# Patient Record
Sex: FEMALE | Race: WHITE | NOT HISPANIC OR LATINO | Employment: FULL TIME | URBAN - METROPOLITAN AREA
[De-identification: names, ages, dates, MRNs, and addresses within clinical notes are randomized per-mention and may not be internally consistent; named-entity substitution may affect disease eponyms.]

---

## 2017-02-16 ENCOUNTER — MYC MEDICAL ADVICE (OUTPATIENT)
Dept: FAMILY MEDICINE | Facility: CLINIC | Age: 22
End: 2017-02-16

## 2017-02-16 DIAGNOSIS — J01.90 ACUTE SINUSITIS WITH SYMPTOMS > 10 DAYS: Primary | ICD-10-CM

## 2017-02-16 NOTE — TELEPHONE ENCOUNTER
Please use the nurse protocol.  If does not meet criteria then she should be seen.    LUISA Enamorado CNP

## 2017-02-17 NOTE — TELEPHONE ENCOUNTER
RN Sinusitis Treatment Protocol: ages 16 and up  Leah Marie Schoenecker, a 21 year old female, is having symptoms reviewed for possible sinus infection.    ASSESSMENT/PLAN:  1.  Antibiotic treatment is indicated as onset of symptoms have been more than 10 days.  Amoxicillin-clavulanate 875mg/125mg orally twice daily or 500mg/125mg three times daily for 5 to 7 days.  2.  Education: ibuprofen or acetaminophen as directed on the bottle, over the counter decongestant, Nasal saline rinse, (Neti-pot or a nasal saline spray is preferred to Afrin nasal spray), Afrin nasal spray no longer than 5 days.  3.  Follow-up: Schedule an appointment with a provider if symptoms do not improve after 3 days of antibiotics or if symptoms return after antibiotic therapy is complete.   4.  Patient verbalized understanding of this plan and is agreeable.    SUBJECTIVE:  Symptoms: Facial pain or pressure over the sinus areas, especially if worse with position change or cough, Nasal discharge/purulent, Nasal congestion and Post nasal drip.     In addition notes: None   Shortness of breath: NO  Onset of symptoms was 18 day(s) ago.    Treatment measures tried include Fluids, OTC meds and Rest.   Course of illness is worsening.  Predisposing conditions include: None    Complicating Factors and Serious Symptoms:   Patient reports: NONE   Patient denies: Fever > 102.5  Orbital pain  Periorbital swelling or erythema  Severe facial swelling or erythema  Severe neck pain  This being the worse headache the patient has ever experienced  Vision changes  COPD (chronic obstructive pulmonary disease)    ALLERGIES:  No Known Allergies  OBJECTIVE:  Weight: 0 lbs 0 oz    Encounter handled by: Nurse Triage.     Anjali Bowles RN

## 2017-03-17 ENCOUNTER — TELEPHONE (OUTPATIENT)
Dept: FAMILY MEDICINE | Facility: CLINIC | Age: 22
End: 2017-03-17

## 2017-09-27 DIAGNOSIS — Z11.3 SCREEN FOR STD (SEXUALLY TRANSMITTED DISEASE): Primary | ICD-10-CM

## 2017-11-02 ENCOUNTER — OFFICE VISIT (OUTPATIENT)
Dept: FAMILY MEDICINE | Facility: CLINIC | Age: 22
End: 2017-11-02
Payer: COMMERCIAL

## 2017-11-02 VITALS
SYSTOLIC BLOOD PRESSURE: 110 MMHG | DIASTOLIC BLOOD PRESSURE: 70 MMHG | BODY MASS INDEX: 32.96 KG/M2 | HEIGHT: 67 IN | TEMPERATURE: 97.6 F | WEIGHT: 210 LBS | HEART RATE: 68 BPM

## 2017-11-02 DIAGNOSIS — F32.0 MILD MAJOR DEPRESSION (H): ICD-10-CM

## 2017-11-02 DIAGNOSIS — R30.0 DYSURIA: ICD-10-CM

## 2017-11-02 DIAGNOSIS — Z00.00 ENCOUNTER FOR ROUTINE ADULT HEALTH EXAMINATION WITHOUT ABNORMAL FINDINGS: Primary | ICD-10-CM

## 2017-11-02 DIAGNOSIS — Z30.41 ENCOUNTER FOR SURVEILLANCE OF CONTRACEPTIVE PILLS: ICD-10-CM

## 2017-11-02 DIAGNOSIS — Z11.3 ROUTINE SCREENING FOR STI (SEXUALLY TRANSMITTED INFECTION): ICD-10-CM

## 2017-11-02 LAB

## 2017-11-02 PROCEDURE — 87088 URINE BACTERIA CULTURE: CPT | Performed by: NURSE PRACTITIONER

## 2017-11-02 PROCEDURE — 81001 URINALYSIS AUTO W/SCOPE: CPT | Performed by: NURSE PRACTITIONER

## 2017-11-02 PROCEDURE — 87086 URINE CULTURE/COLONY COUNT: CPT | Performed by: NURSE PRACTITIONER

## 2017-11-02 PROCEDURE — 87186 SC STD MICRODIL/AGAR DIL: CPT | Performed by: NURSE PRACTITIONER

## 2017-11-02 PROCEDURE — 87491 CHLMYD TRACH DNA AMP PROBE: CPT | Performed by: NURSE PRACTITIONER

## 2017-11-02 PROCEDURE — 99395 PREV VISIT EST AGE 18-39: CPT | Performed by: NURSE PRACTITIONER

## 2017-11-02 RX ORDER — NORGESTIMATE AND ETHINYL ESTRADIOL 0.25-0.035
1 KIT ORAL DAILY
Qty: 84 TABLET | Refills: 3 | Status: SHIPPED | OUTPATIENT
Start: 2017-11-02 | End: 2018-03-01

## 2017-11-02 ASSESSMENT — ANXIETY QUESTIONNAIRES
7. FEELING AFRAID AS IF SOMETHING AWFUL MIGHT HAPPEN: MORE THAN HALF THE DAYS
3. WORRYING TOO MUCH ABOUT DIFFERENT THINGS: MORE THAN HALF THE DAYS
1. FEELING NERVOUS, ANXIOUS, OR ON EDGE: MORE THAN HALF THE DAYS
2. NOT BEING ABLE TO STOP OR CONTROL WORRYING: NEARLY EVERY DAY
6. BECOMING EASILY ANNOYED OR IRRITABLE: SEVERAL DAYS
GAD7 TOTAL SCORE: 13
5. BEING SO RESTLESS THAT IT IS HARD TO SIT STILL: MORE THAN HALF THE DAYS

## 2017-11-02 ASSESSMENT — PATIENT HEALTH QUESTIONNAIRE - PHQ9
5. POOR APPETITE OR OVEREATING: SEVERAL DAYS
SUM OF ALL RESPONSES TO PHQ QUESTIONS 1-9: 12

## 2017-11-02 NOTE — PROGRESS NOTES
SUBJECTIVE:   CC: Leah Marie Schoenecker is an 22 year old woman who presents for preventive health visit.     Healthy Habits:    Do you get at least three servings of calcium containing foods daily (dairy, green leafy vegetables, etc.)? yes    Amount of exercise or daily activities, outside of work: none - golf during summer     Problems taking medications regularly No    Medication side effects: Yes - see below     Have you had an eye exam in the past two years? yes    Do you see a dentist twice per year? yes    Do you have sleep apnea, excessive snoring or daytime drowsiness?no      Medication Followup of [facility]     Taking Medication as prescribed: yes    Side Effects:  Bloating, sore breast, cramps     Medication Helping Symptoms:  yes     No personal or family history of blood clots, stroke, migraine with aura or breast cancer.  Does not use tobacco.    Genitourinary symptoms      Duration: 1.5 months      Description:  Frequency, cloudy, foul smell, painful     Intensity:  moderate    Accompanying signs and symptoms (fever/discharge/nausea/vomiting/back or abdominal pain):  Nausea     History (frequent UTI's/kidney stones/prostate problems): None  Sexually active: YES    Precipitating or alleviating factors: None    Therapies tried and outcome: none   Outcome: na        Today's PHQ-2 Score:   PHQ-2 ( 1999 Pfizer) 5/27/2016 7/26/2013   Q1: Little interest or pleasure in doing things - 3   Q2: Feeling down, depressed or hopeless - 2   PHQ-2 Score - 5   Q1: Little interest or pleasure in doing things Several days -   Q2: Feeling down, depressed or hopeless Several days -   PHQ-2 Score 2 -       Abuse: Current or Past(Physical, Sexual or Emotional)- No  Do you feel safe in your environment - Yes  Social History   Substance Use Topics     Smoking status: Never Smoker     Smokeless tobacco: Never Used     Alcohol use Yes      Comment: monthly at most     The patient does not drink >3 drinks per day nor >7  "drinks per week.    Reviewed orders with patient.  Reviewed health maintenance and updated orders accordingly - Yes  Labs reviewed in EPIC    Mammogram not appropriate for this patient based on age.    Pertinent mammograms are reviewed under the imaging tab.  History of abnormal Pap smear: NO - age 30- 65 PAP every 3 years recommended    Reviewed and updated as needed this visit by clinical staff  Tobacco  Allergies  Meds  Med Hx  Surg Hx  Fam Hx  Soc Hx        Reviewed and updated as needed this visit by Provider              ROS:  C: NEGATIVE for fever, chills, change in weight  I: NEGATIVE for worrisome rashes, moles or lesions  E: NEGATIVE for vision changes or irritation  ENT: NEGATIVE for ear, mouth and throat problems  R: NEGATIVE for significant cough or SOB  B: NEGATIVE for masses, tenderness or discharge  CV: NEGATIVE for chest pain, palpitations or peripheral edema  GI: NEGATIVE for nausea, abdominal pain, heartburn, or change in bowel habits   female: POSITIVE for dysuria and urgency  M: NEGATIVE for significant arthralgias or myalgia  N: NEGATIVE for weakness, dizziness or paresthesias  P: NEGATIVE for changes in mood or affect    OBJECTIVE:   /70 (Cuff Size: Adult Large)  Pulse 68  Temp 97.6  F (36.4  C) (Tympanic)  Ht 5' 7\" (1.702 m)  Wt 210 lb (95.3 kg)  BMI 32.89 kg/m2  EXAM:  GENERAL: healthy, alert and no distress  EYES: Eyes grossly normal to inspection, PERRL and conjunctivae and sclerae normal  HENT: ear canals and TM's normal, nose and mouth without ulcers or lesions  NECK: no adenopathy, no asymmetry, masses, or scars and thyroid normal to palpation  RESP: lungs clear to auscultation - no rales, rhonchi or wheezes  BREAST: normal without masses, tenderness or nipple discharge and no palpable axillary masses or adenopathy  CV: regular rate and rhythm, normal S1 S2, no S3 or S4, no murmur, click or rub, no peripheral edema and peripheral pulses strong  ABDOMEN: soft, " "nontender, no hepatosplenomegaly, no masses and bowel sounds normal  MS: no gross musculoskeletal defects noted, no edema  SKIN: no suspicious lesions or rashes  NEURO: Normal strength and tone, mentation intact and speech normal  PSYCH: mentation appears normal, affect normal/bright    ASSESSMENT/PLAN:   1. Encounter for routine adult health examination without abnormal findings    - Urine Microscopic    2. Encounter for surveillance of contraceptive pills  Risks and benefits discussed.  Angie verbalized understanding of risks.  - norgestimate-ethinyl estradiol (ORTHO-CYCLEN, SPRINTEC) 0.25-35 MG-MCG per tablet; Take 1 tablet by mouth daily  Dispense: 84 tablet; Refill: 3    3. Routine screening for STI (sexually transmitted infection)    - Chlamydia trachomatis PCR    4. Dysuria  Will culture with symptoms and abnormal findings.  - *UA reflex to Microscopic and Culture ([place] [facility] (except [place] and [place])  - Urine Culture Aerobic Bacterial    5. Mild major depression (H)  PHQ-9 elevated-Angie feels that this is in good control.    Home care instructions were reviewed with the patient. The risks, benefits and treatment options of prescribed medications or other treatments have been discussed with the patient. The patient verbalized their understanding and should call or follow up if no improvement or if they develop further problems.      COUNSELING:   Reviewed preventive health counseling, as reflected in patient instructions         reports that she has never smoked. She has never used smokeless tobacco.    Estimated body mass index is 32.89 kg/(m^2) as calculated from the following:    Height as of this encounter: 5' 7\" (1.702 m).    Weight as of this encounter: 210 lb (95.3 kg).         Counseling Resources:  ATP IV Guidelines  Pooled Cohorts Equation Calculator  Breast Cancer Risk Calculator  FRAX Risk Assessment  ICSI Preventive Guidelines  Dietary Guidelines for Americans, 2010  USDA's " MyPlate  ASA Prophylaxis  Lung CA Screening    LUISA Enamorado CNP  [facility]

## 2017-11-02 NOTE — MR AVS SNAPSHOT
After Visit Summary   11/2/2017    Leah Marie Schoenecker    MRN: [id number]           Patient Information     Date Of Birth          1995        Visit Information        Provider Department      11/2/2017 9:20 AM Jaimee Kaplan APRN [facility]        Today's Diagnoses     Encounter for routine adult health examination without abnormal findings    -  1    Encounter for surveillance of contraceptive pills        Routine screening for STI (sexually transmitted infection)        Dysuria        Mild major depression (H)          Care Instructions      Preventive Health Recommendations  Female Ages 18 to 25     Yearly exam:     See your health care provider every year in order to  o Review health changes.   o Discuss preventive care.    o Review your medicines if your doctor has prescribed any.      You should be tested each year for STDs (sexually transmitted diseases).       After age 20, talk to your provider about how often you should have cholesterol testing.      Starting at age 21, get a Pap test every three years. If you have an abnormal result, your doctor may have you test more often.      If you are at risk for diabetes, you should have a diabetes test (fasting glucose).     Shots:     Get a flu shot each year.     Get a tetanus shot every 10 years.     Consider getting the shot (vaccine) that prevents cervical cancer (Gardasil).    Nutrition:     Eat at least 5 servings of fruits and vegetables each day.    Eat whole-grain bread, whole-wheat pasta and brown rice instead of white grains and rice.    Talk to your provider about Calcium and Vitamin D.     Lifestyle    Exercise at least 150 minutes a week each week (30 minutes a day, 5 days a week). This will help you control your weight and prevent disease.    Limit alcohol to one drink per day.    No smoking.     Wear sunscreen to prevent skin cancer.    See your dentist every six months for an exam and  "cleaning.          Follow-ups after your visit        Who to contact     If you have questions or need follow up information about today's clinic visit or your schedule please contact [facility] directly at [phone].  Normal or non-critical lab and imaging results will be communicated to you by MyChart, letter or phone within 4 business days after the clinic has received the results. If you do not hear from us within 7 days, please contact the clinic through MyChart or phone. If you have a critical or abnormal lab result, we will notify you by phone as soon as possible.  Submit refill requests through [facility] or call your pharmacy and they will forward the refill request to us. Please allow 3 business days for your refill to be completed.          Additional Information About Your Visit        [employer] Information     [facility] gives you secure access to your electronic health record. If you see a primary care provider, you can also send messages to your care team and make appointments. If you have questions, please call your primary care clinic.  If you do not have a primary care provider, please call [phone] and they will assist you.        Care EveryWhere ID     This is your Care EveryWhere ID. This could be used by other organizations to access your [place] medical records  [id number]        Your Vitals Were     Pulse Temperature Height BMI (Body Mass Index)          68 97.6  F (36.4  C) (Tympanic) 5' 7\" (1.702 m) 32.89 kg/m2         Blood Pressure from Last 3 Encounters:   11/02/17 110/70   12/15/16 107/69   09/20/16 112/64    Weight from Last 3 Encounters:   11/02/17 210 lb (95.3 kg)   12/15/16 195 lb (88.5 kg)   09/20/16 193 lb (87.5 kg)              We Performed the Following     *UA reflex to Microscopic and Culture ([place] and [facility] (except [place] and [place])     Chlamydia trachomatis PCR     DEPRESSION ACTION PLAN (DAP)     Urine Culture Aerobic Bacterial     " Urine Microscopic          Where to get your medicines      These medications were sent to [id number] - [place], [place] - [address]  [address] [address]    Hours:  Closed 10-16-08 business to [facility] Phone:  [phone]     norgestimate-ethinyl estradiol 0.25-35 MG-MCG per tablet          Primary Care Provider Office Phone # Fax #    Jaimee Kaplan, LUISA [phone] [address] [facility] [facility] [address]        Equal Access to Services     [facility]: Hadii nahun vivar hadasho Soomaali, waaxda luqadaha, qaybta kaalmada anastasiia, davie cross . So [facility] [phone].    ATENCIÓN: Si habla español, tiene a meek disposición servicios gratuitos de asistencia lingüística. [facility] [phone].    We comply with applicable federal civil rights laws and [place] laws. We do not discriminate on the basis of race, color, national origin, age, disability, sex, sexual orientation, or gender identity.            Thank you!     Thank you for choosing [facility]  for your care. Our goal is always to provide you with excellent care. Hearing back from our patients is one way we can continue to improve our services. Please take a few minutes to complete the written survey that you may receive in the mail after your visit with us. Thank you!             Your Updated Medication List - Protect others around you: Learn how to safely use, store and throw away your medicines at www.disposemymeds.org.          This list is accurate as of: 11/2/17  9:52 AM.  Always use your most recent med list.                   Brand Name Dispense Instructions for use Diagnosis    ibuprofen 800 MG tablet    ADVIL/MOTRIN    90 tablet    Take 1 tablet (800 mg) by mouth every 8 hours as needed for moderate pain    Left knee pain, Trochanteric bursitis of left hip, Sacroiliac joint dysfunction of left side       norgestimate-ethinyl  estradiol 0.25-35 MG-MCG per tablet    ORTHO-CYCLEN, SPRINTEC    84 tablet    Take 1 tablet by mouth daily    Encounter for surveillance of contraceptive pills

## 2017-11-02 NOTE — NURSING NOTE
"Chief Complaint   Patient presents with     Physical     Flu Shot       Initial /70 (Cuff Size: Adult Large)  Pulse 68  Temp 97.6  F (36.4  C) (Tympanic)  Ht 5' 7\" (1.702 m)  Wt 210 lb (95.3 kg)  BMI 32.89 kg/m2 Estimated body mass index is 32.89 kg/(m^2) as calculated from the following:    Height as of this encounter: 5' 7\" (1.702 m).    Weight as of this encounter: 210 lb (95.3 kg).  Medication Reconciliation: complete    Health Maintenance that is potentially due pending provider review:  PHQ9 and Chlamydia screening    n/a    Is there anyone who you would like to be able to receive your results? No  If yes have patient fill out DOREEN    Katie Wilson CMA    "

## 2017-11-03 LAB
C TRACH DNA SPEC QL NAA+PROBE: NEGATIVE
SPECIMEN SOURCE: NORMAL

## 2017-11-03 ASSESSMENT — ANXIETY QUESTIONNAIRES: GAD7 TOTAL SCORE: 13

## 2017-11-04 LAB
BACTERIA SPEC CULT: ABNORMAL
Lab: ABNORMAL
SPECIMEN SOURCE: ABNORMAL

## 2017-11-06 DIAGNOSIS — N30.00 ACUTE CYSTITIS WITHOUT HEMATURIA: Primary | ICD-10-CM

## 2017-11-06 RX ORDER — SULFAMETHOXAZOLE/TRIMETHOPRIM 800-160 MG
1 TABLET ORAL 2 TIMES DAILY
Qty: 6 TABLET | Refills: 0 | Status: SHIPPED | OUTPATIENT
Start: 2017-11-06 | End: 2017-11-09

## 2018-03-01 ENCOUNTER — OFFICE VISIT (OUTPATIENT)
Dept: FAMILY MEDICINE | Facility: CLINIC | Age: 23
End: 2018-03-01
Payer: COMMERCIAL

## 2018-03-01 VITALS
HEIGHT: 67 IN | HEART RATE: 66 BPM | TEMPERATURE: 97.5 F | DIASTOLIC BLOOD PRESSURE: 72 MMHG | SYSTOLIC BLOOD PRESSURE: 122 MMHG | BODY MASS INDEX: 32.02 KG/M2 | WEIGHT: 204 LBS

## 2018-03-01 DIAGNOSIS — J01.90 ACUTE SINUSITIS WITH SYMPTOMS > 10 DAYS: Primary | ICD-10-CM

## 2018-03-01 DIAGNOSIS — Z30.011 ENCOUNTER FOR INITIAL PRESCRIPTION OF CONTRACEPTIVE PILLS: ICD-10-CM

## 2018-03-01 PROCEDURE — 99214 OFFICE O/P EST MOD 30 MIN: CPT | Performed by: NURSE PRACTITIONER

## 2018-03-01 RX ORDER — LEVONORGESTREL/ETHIN.ESTRADIOL 0.1-0.02MG
1 TABLET ORAL DAILY
Qty: 84 TABLET | Refills: 3 | Status: SHIPPED | OUTPATIENT
Start: 2018-03-01 | End: 2019-02-05

## 2018-03-01 NOTE — MR AVS SNAPSHOT
After Visit Summary   3/1/2018    Leah Marie Schoenecker    MRN: [id number]           Patient Information     Date Of Birth          1995        Visit Information        Provider Department      3/1/2018 10:40 AM Jaimee Kaplan APRN [facility]        Today's Diagnoses     Acute sinusitis with symptoms > 10 days    -  1      Care Instructions    Augmentin sent to the pharmacy for symptoms    Follow up if symptoms do not improve or worsen.    Sinusitis (Antibiotic Treatment)    The sinuses are air-filled spaces within the bones of the face. They connect to the inside of the nose. Sinusitis is an inflammation of the tissue lining the sinus cavity. Sinus inflammation can occur during a cold. It can also be due to allergies to pollens and other particles in the air. Sinusitis can cause symptoms of sinus congestion and fullness. A sinus infection causes fever, headache and facial pain. There is often green or yellow drainage from the nose or into the back of the throat (post-nasal drip). You have been given antibiotics to treat this condition.  Home care:    Take the full course of antibiotics as instructed. Do not stop taking them, even if you feel better.    Drink plenty of water, hot tea, and other liquids. This may help thin mucus. It also may promote sinus drainage.    Heat may help soothe painful areas of the face. Use a towel soaked in hot water. Or,  the shower and direct the hot spray onto your face. Using a vaporizer along with a menthol rub at night may also help.     An expectorant containing guaifenesin may help thin the mucus and promote drainage from the sinuses.    Over-the-counter decongestants may be used unless a similar medicine was prescribed. Nasal sprays work the fastest. Use one that contains phenylephrine or oxymetazoline. First blow the nose gently. Then use the spray. Do not use these medicines more often than directed on the label or  symptoms may get worse. You may also use tablets containing pseudoephedrine. Avoid products that combine ingredients, because side effects may be increased. Read labels. You can also ask the pharmacist for help. (NOTE: Persons with high blood pressure should not use decongestants. They can raise blood pressure.)    Over-the-counter antihistamines may help if allergies contributed to your sinusitis.      Do not use nasal rinses or irrigation during an acute sinus infection, unless told to by your health care provider. Rinsing may spread the infection to other sinuses.    Use acetaminophen or ibuprofen to control pain, unless another pain medicine was prescribed. (If you have chronic liver or kidney disease or ever had a stomach ulcer, talk with your doctor before using these medicines. Aspirin should never be used in anyone under 18 years of age who is ill with a fever. It may cause severe liver damage.)    Don't smoke. This can worsen symptoms.  Follow-up care  Follow up with your healthcare provider or our staff if you are not improving within the next week.  When to seek medical advice  Call your healthcare provider if any of these occur:    Facial pain or headache becoming more severe    Stiff neck    Unusual drowsiness or confusion    Swelling of the forehead or eyelids    Vision problems, including blurred or double vision    Fever of 100.4 F (38 C) or higher, or as directed by your healthcare provider    Seizure    Breathing problems    Symptoms not resolving within 10 days  Date Last Reviewed: 4/13/2015 2000-2017 [employer] [address] [place] [address]. All rights reserved. This information is not intended as a substitute for professional medical care. Always follow your healthcare professional's instructions.                Follow-ups after your visit        Who to contact     If you have questions or need follow up information about today's clinic visit or your schedule  "please contact [facility] directly at [phone].  Normal or non-critical lab and imaging results will be communicated to you by MyChart, letter or phone within 4 business days after the clinic has received the results. If you do not hear from us within 7 days, please contact the clinic through [facility] [employer] or phone. If you have a critical or abnormal lab result, we will notify you by phone as soon as possible.  Submit refill requests through [facility] or call your pharmacy and they will forward the refill request to us. Please allow 3 business days for your refill to be completed.          Additional Information About Your Visit        [employer] Information     [facility] gives you secure access to your electronic health record. If you see a primary care provider, you can also send messages to your care team and make appointments. If you have questions, please call your primary care clinic.  If you do not have a primary care provider, please call [phone] and they will assist you.        Care EveryWhere ID     This is your Care EveryWhere ID. This could be used by other organizations to access your [place] medical records  [id number]        Your Vitals Were     Pulse Temperature Height BMI (Body Mass Index)          66 97.5  F (36.4  C) (Tympanic) 5' 7\" (1.702 m) 31.95 kg/m2         Blood Pressure from Last 3 Encounters:   03/01/18 122/72   11/02/17 110/70   12/15/16 107/69    Weight from Last 3 Encounters:   03/01/18 204 lb (92.5 kg)   11/02/17 210 lb (95.3 kg)   12/15/16 195 lb (88.5 kg)              Today, you had the following     No orders found for display         Today's Medication Changes          These changes are accurate as of 3/1/18 11:09 AM.  If you have any questions, ask your nurse or doctor.               Start taking these medicines.        Dose/Directions    amoxicillin-clavulanate 875-125 MG per tablet   Commonly known as:  AUGMENTIN   Used for:  Acute sinusitis with symptoms > 10 " days   Started by:  Jaimee Kaplan, LUISA CNP        Dose:  1 tablet   Take 1 tablet by mouth 2 times daily   Quantity:  20 tablet   Refills:  0            Where to get your medicines      These medications were sent to [id number] - [place], [place] - [address]  [address] [address]    Hours:  Closed 10-16-08 business to [facility] Phone:  [phone]     amoxicillin-clavulanate 875-125 MG per tablet                Primary Care Provider Office Phone # Fax #    Jaimee Archer LUISA Kaplan [phone] [address] [address]        Equal Access to Services     [place] : Arnie khano Soabbe, waaxda luqadaha, qaybta kaalmada adedelilahyada, davie cross . So [facility] [phone].    ATENCIÓN: Si habla español, tiene a meek disposición servicios gratuitos de asistencia lingüística. Llame al [phone].    We comply with applicable federal civil rights laws and [place] laws. We do not discriminate on the basis of race, color, national origin, age, disability, sex, sexual orientation, or gender identity.            Thank you!     Thank you for choosing [facility]  for your care. Our goal is always to provide you with excellent care. Hearing back from our patients is one way we can continue to improve our services. Please take a few minutes to complete the written survey that you may receive in the mail after your visit with us. Thank you!             Your Updated Medication List - Protect others around you: Learn how to safely use, store and throw away your medicines at www.disposemymeds.org.          This list is accurate as of 3/1/18 11:09 AM.  Always use your most recent med list.                   Brand Name Dispense Instructions for use Diagnosis    amoxicillin-clavulanate 875-125 MG per tablet    AUGMENTIN    20 tablet    Take 1 tablet by mouth 2 times daily    Acute sinusitis  with symptoms > 10 days       ibuprofen 800 MG tablet    ADVIL/MOTRIN    90 tablet    Take 1 tablet (800 mg) by mouth every 8 hours as needed for moderate pain    Left knee pain, Trochanteric bursitis of left hip, Sacroiliac joint dysfunction of left side       norgestimate-ethinyl estradiol 0.25-35 MG-MCG per tablet    ORTHO-CYCLEN, SPRINTEC    84 tablet    Take 1 tablet by mouth daily    Encounter for surveillance of contraceptive pills

## 2018-03-01 NOTE — PATIENT INSTRUCTIONS
Augmentin sent to the pharmacy for symptoms    Follow up if symptoms do not improve or worsen.    Sinusitis (Antibiotic Treatment)    The sinuses are air-filled spaces within the bones of the face. They connect to the inside of the nose. Sinusitis is an inflammation of the tissue lining the sinus cavity. Sinus inflammation can occur during a cold. It can also be due to allergies to pollens and other particles in the air. Sinusitis can cause symptoms of sinus congestion and fullness. A sinus infection causes fever, headache and facial pain. There is often green or yellow drainage from the nose or into the back of the throat (post-nasal drip). You have been given antibiotics to treat this condition.  Home care:    Take the full course of antibiotics as instructed. Do not stop taking them, even if you feel better.    Drink plenty of water, hot tea, and other liquids. This may help thin mucus. It also may promote sinus drainage.    Heat may help soothe painful areas of the face. Use a towel soaked in hot water. Or,  the shower and direct the hot spray onto your face. Using a vaporizer along with a menthol rub at night may also help.     An expectorant containing guaifenesin may help thin the mucus and promote drainage from the sinuses.    Over-the-counter decongestants may be used unless a similar medicine was prescribed. Nasal sprays work the fastest. Use one that contains phenylephrine or oxymetazoline. First blow the nose gently. Then use the spray. Do not use these medicines more often than directed on the label or symptoms may get worse. You may also use tablets containing pseudoephedrine. Avoid products that combine ingredients, because side effects may be increased. Read labels. You can also ask the pharmacist for help. (NOTE: Persons with high blood pressure should not use decongestants. They can raise blood pressure.)    Over-the-counter antihistamines may help if allergies contributed to your  sinusitis.      Do not use nasal rinses or irrigation during an acute sinus infection, unless told to by your health care provider. Rinsing may spread the infection to other sinuses.    Use acetaminophen or ibuprofen to control pain, unless another pain medicine was prescribed. (If you have chronic liver or kidney disease or ever had a stomach ulcer, talk with your doctor before using these medicines. Aspirin should never be used in anyone under 18 years of age who is ill with a fever. It may cause severe liver damage.)    Don't smoke. This can worsen symptoms.  Follow-up care  Follow up with your healthcare provider or our staff if you are not improving within the next week.  When to seek medical advice  Call your healthcare provider if any of these occur:    Facial pain or headache becoming more severe    Stiff neck    Unusual drowsiness or confusion    Swelling of the forehead or eyelids    Vision problems, including blurred or double vision    Fever of 100.4 F (38 C) or higher, or as directed by your healthcare provider    Seizure    Breathing problems    Symptoms not resolving within 10 days  Date Last Reviewed: 4/13/2015 2000-2017 The "[employer]. [address] [place] [address], [place] [address]. All rights reserved. This information is not intended as a substitute for professional medical care. Always follow your healthcare professional's instructions.

## 2018-03-01 NOTE — PROGRESS NOTES
SUBJECTIVE:   Leah Marie Schoenecker is a 22 year old female who presents to clinic today for the following health issues:      ENT Symptoms             Symptoms: cc Present Absent Comment   Fever/Chills   x Fever x 6 days    Fatigue  x     Muscle Aches   x    Eye Irritation  x  Discharge in am    Sneezing   x    Nasal Jose Antonio/Drg x      Sinus Pressure/Pain x      Loss of smell  x     Dental pain  x     Sore Throat  x     Swollen Glands   x    Ear Pain/Fullness x   Bilateral- feeling like under water    Cough   x    Wheeze   x    Chest Pain   x    Shortness of breath   x    Rash   x    Other   x      Symptom duration:  2 weeks    Symptom severity:  mild    Treatments tried:  sudafed    Contacts:  none      Heavy menstrual cycles  Back pain, cramping  Both sisters with endometriosis   No personal or family history of blood clots, stroke, migraine with aura or breast cancer.  Does not use tobacco.  Sexually active with 1 partner-no concern for sti    Problem list and histories reviewed & adjusted, as indicated.  Additional history: as documented    Patient Active Problem List   Diagnosis     Mild major depression (H)     Anxiety     Past Surgical History:   Procedure Laterality Date     COLONOSCOPY N/A 6/10/2015    Procedure: COLONOSCOPY;  Surgeon: Jose Leroy MD;  Location: [facility]     NO HISTORY OF SURGERY         Social History   Substance Use Topics     Smoking status: Never Smoker     Smokeless tobacco: Never Used     Alcohol use Yes      Comment: monthly at most     Family History   Problem Relation Age of Onset     Hypertension Maternal Grandmother      DIABETES Paternal Grandfather      HEART DISEASE Paternal Grandfather      Other - See Comments Sister      endometrises      Hypertension Mother      Other - See Comments Sister      endometrioses      Thyroid Disease Maternal Aunt          Current Outpatient Prescriptions   Medication Sig Dispense Refill     amoxicillin-clavulanate (AUGMENTIN)  "875-125 MG per tablet Take 1 tablet by mouth 2 times daily 20 tablet 0     levonorgestrel-ethinyl estradiol (AVIANE,ALESSE,LESSINA) 0.1-20 MG-MCG per tablet Take 1 tablet by mouth daily 84 tablet 3     ibuprofen (ADVIL,MOTRIN) 800 MG tablet Take 1 tablet (800 mg) by mouth every 8 hours as needed for moderate pain 90 tablet 1     No Known Allergies  Labs reviewed in EPIC    Reviewed and updated as needed this visit by clinical staff       Reviewed and updated as needed this visit by Provider         ROS:  Constitutional, HEENT, cardiovascular, pulmonary, gi and gu systems are negative, except as otherwise noted.    OBJECTIVE:     /72 (Cuff Size: Adult Large)  Pulse 66  Temp 97.5  F (36.4  C) (Tympanic)  Ht 5' 7\" (1.702 m)  Wt 204 lb (92.5 kg)  BMI 31.95 kg/m2  Body mass index is 31.95 kg/(m^2).  GENERAL: healthy, alert and no distress  HENT: normal cephalic/atraumatic, both ears: clear effusion, nose and mouth without ulcers or lesions, nasal mucosa edematous , oropharynx clear, oral mucous membranes moist and sinuses: maxillary tenderness on bilateral  NECK: no adenopathy  RESP: lungs clear to auscultation - no rales, rhonchi or wheezes  CV: regular rate and rhythm, normal S1 S2, no S3 or S4, no murmur, click or rub, no peripheral edema and peripheral pulses strong  ABDOMEN: soft, nontender, no hepatosplenomegaly, no masses and bowel sounds normal  PSYCH: mentation appears normal, affect normal/bright    Diagnostic Test Results:  none     ASSESSMENT/PLAN:     1. Acute sinusitis with symptoms > 10 days  Augmentin sent to the pharmacy for ongoing symptoms.  Symptomatic care and follow up discussed.  - amoxicillin-clavulanate (AUGMENTIN) 875-125 MG per tablet; Take 1 tablet by mouth 2 times daily  Dispense: 20 tablet; Refill: 0    2. Encounter for initial prescription of contraceptive pills  Will switch to Alesse with symptoms.  Risks and benefits of medication discussed.  Consider progesterone only if no " improvement of symptoms with change.  - levonorgestrel-ethinyl estradiol (AVIANE,ALESSE,LESSINA) 0.1-20 MG-MCG per tablet; Take 1 tablet by mouth daily  Dispense: 84 tablet; Refill: 3    Home care instructions were reviewed with the patient. The risks, benefits and treatment options of prescribed medications or other treatments have been discussed with the patient. The patient verbalized their understanding and should call or follow up if no improvement or if they develop further problems.    Patient Instructions   Augmentin sent to the pharmacy for symptoms    Follow up if symptoms do not improve or worsen.    Sinusitis (Antibiotic Treatment)    The sinuses are air-filled spaces within the bones of the face. They connect to the inside of the nose. Sinusitis is an inflammation of the tissue lining the sinus cavity. Sinus inflammation can occur during a cold. It can also be due to allergies to pollens and other particles in the air. Sinusitis can cause symptoms of sinus congestion and fullness. A sinus infection causes fever, headache and facial pain. There is often green or yellow drainage from the nose or into the back of the throat (post-nasal drip). You have been given antibiotics to treat this condition.  Home care:    Take the full course of antibiotics as instructed. Do not stop taking them, even if you feel better.    Drink plenty of water, hot tea, and other liquids. This may help thin mucus. It also may promote sinus drainage.    Heat may help soothe painful areas of the face. Use a towel soaked in hot water. Or,  the shower and direct the hot spray onto your face. Using a vaporizer along with a menthol rub at night may also help.     An expectorant containing guaifenesin may help thin the mucus and promote drainage from the sinuses.    Over-the-counter decongestants may be used unless a similar medicine was prescribed. Nasal sprays work the fastest. Use one that contains phenylephrine or  oxymetazoline. First blow the nose gently. Then use the spray. Do not use these medicines more often than directed on the label or symptoms may get worse. You may also use tablets containing pseudoephedrine. Avoid products that combine ingredients, because side effects may be increased. Read labels. You can also ask the pharmacist for help. (NOTE: Persons with high blood pressure should not use decongestants. They can raise blood pressure.)    Over-the-counter antihistamines may help if allergies contributed to your sinusitis.      Do not use nasal rinses or irrigation during an acute sinus infection, unless told to by your health care provider. Rinsing may spread the infection to other sinuses.    Use acetaminophen or ibuprofen to control pain, unless another pain medicine was prescribed. (If you have chronic liver or kidney disease or ever had a stomach ulcer, talk with your doctor before using these medicines. Aspirin should never be used in anyone under 18 years of age who is ill with a fever. It may cause severe liver damage.)    Don't smoke. This can worsen symptoms.  Follow-up care  Follow up with your healthcare provider or our staff if you are not improving within the next week.  When to seek medical advice  Call your healthcare provider if any of these occur:    Facial pain or headache becoming more severe    Stiff neck    Unusual drowsiness or confusion    Swelling of the forehead or eyelids    Vision problems, including blurred or double vision    Fever of 100.4 F (38 C) or higher, or as directed by your healthcare provider    Seizure    Breathing problems    Symptoms not resolving within 10 days  Date Last Reviewed: 4/13/2015 2000-2017 [employer]. [address] [place] [address], [place] [id number]. All rights reserved. This information is not intended as a substitute for professional medical care. Always follow your healthcare professional's instructions.            LUISA Enamorado  [facility]

## 2018-05-08 ENCOUNTER — MYC MEDICAL ADVICE (OUTPATIENT)
Dept: FAMILY MEDICINE | Facility: CLINIC | Age: 23
End: 2018-05-08

## 2018-05-08 ASSESSMENT — PATIENT HEALTH QUESTIONNAIRE - PHQ9
SUM OF ALL RESPONSES TO PHQ QUESTIONS 1-9: 8
10. IF YOU CHECKED OFF ANY PROBLEMS, HOW DIFFICULT HAVE THESE PROBLEMS MADE IT FOR YOU TO DO YOUR WORK, TAKE CARE OF THINGS AT HOME, OR GET ALONG WITH OTHER PEOPLE: VERY DIFFICULT
SUM OF ALL RESPONSES TO PHQ QUESTIONS 1-9: 8

## 2018-05-09 ASSESSMENT — PATIENT HEALTH QUESTIONNAIRE - PHQ9: SUM OF ALL RESPONSES TO PHQ QUESTIONS 1-9: 8

## 2019-02-05 DIAGNOSIS — Z30.011 ENCOUNTER FOR INITIAL PRESCRIPTION OF CONTRACEPTIVE PILLS: ICD-10-CM

## 2019-02-05 NOTE — TELEPHONE ENCOUNTER
"Requested Prescriptions   Pending Prescriptions Disp Refills     FALMINA 0.1-20 MG-MCG tablet [Pharmacy Med Name: FALMINA TABLETS 28S] 84 tablet 0     Sig: TAKE ONE TABLET BY MOUTH ONE TIME DAILY    Contraceptives Protocol Passed - 2/5/2019  3:14 PM       Passed - Patient is not a current smoker if age is 35 or older       Passed - Recent (12 mo) or future (30 days) visit within the authorizing provider's specialty    Patient had office visit in the last 12 months or has a visit in the next 30 days with authorizing provider or within the authorizing provider's specialty.  See \"Patient Info\" tab in inbasket, or \"Choose Columns\" in Meds & Orders section of the refill encounter.      Last Written Prescription Date:  3/1/18  Last Fill Quantity: 84,  # refills: 3   Last office visit: 3/1/2018 with prescribing provider:     Future Office Visit:             Passed - Medication is active on med list       Passed - No active pregnancy on record       Passed - No positive pregnancy test in past 12 months          "

## 2019-02-06 RX ORDER — LEVONORGESTREL AND ETHINYL ESTRADIOL 0.1-0.02MG
KIT ORAL
Qty: 28 TABLET | Refills: 0 | Status: SHIPPED | OUTPATIENT
Start: 2019-02-06 | End: 2019-02-26

## 2019-02-06 NOTE — TELEPHONE ENCOUNTER
Medication is being filled for 1 time refill only due to:  Patient needs to be seen because due for ov in March..Reminder placed on pharmacy notes.    MIO Villalba

## 2019-02-23 ENCOUNTER — HOSPITAL ENCOUNTER (EMERGENCY)
Facility: CLINIC | Age: 24
Discharge: HOME | End: 2019-02-23
Attending: PHYSICIAN ASSISTANT | Admitting: PHYSICIAN ASSISTANT
Payer: COMMERCIAL

## 2019-02-23 ENCOUNTER — APPOINTMENT (OUTPATIENT)
Dept: GENERAL RADIOLOGY | Facility: CLINIC | Age: 24
End: 2019-02-23
Attending: PHYSICIAN ASSISTANT
Payer: COMMERCIAL

## 2019-02-23 VITALS
HEART RATE: 71 BPM | RESPIRATION RATE: 16 BRPM | OXYGEN SATURATION: 100 % | SYSTOLIC BLOOD PRESSURE: 132 MMHG | DIASTOLIC BLOOD PRESSURE: 86 MMHG | TEMPERATURE: 98.7 F

## 2019-02-23 DIAGNOSIS — R07.9 CHEST PAIN: ICD-10-CM

## 2019-02-23 DIAGNOSIS — J06.9 VIRAL URI WITH COUGH: ICD-10-CM

## 2019-02-23 LAB
INTERNAL QC OK POCT: YES
S PYO AG THROAT QL IA.RAPID: NEGATIVE

## 2019-02-23 PROCEDURE — 87880 STREP A ASSAY W/OPTIC: CPT | Performed by: PHYSICIAN ASSISTANT

## 2019-02-23 PROCEDURE — 87081 CULTURE SCREEN ONLY: CPT | Performed by: PHYSICIAN ASSISTANT

## 2019-02-23 PROCEDURE — 99213 OFFICE O/P EST LOW 20 MIN: CPT | Mod: Z6 | Performed by: PHYSICIAN ASSISTANT

## 2019-02-23 PROCEDURE — 71046 X-RAY EXAM CHEST 2 VIEWS: CPT

## 2019-02-23 PROCEDURE — G0463 HOSPITAL OUTPT CLINIC VISIT: HCPCS | Performed by: PHYSICIAN ASSISTANT

## 2019-02-23 RX ORDER — ALBUTEROL SULFATE 90 UG/1
2 AEROSOL, METERED RESPIRATORY (INHALATION) EVERY 6 HOURS PRN
Qty: 1 INHALER | Refills: 0 | Status: SHIPPED | OUTPATIENT
Start: 2019-02-23 | End: 2020-05-29

## 2019-02-23 RX ORDER — BENZONATATE 100 MG/1
200 CAPSULE ORAL 3 TIMES DAILY PRN
Qty: 42 CAPSULE | Refills: 0 | Status: SHIPPED | OUTPATIENT
Start: 2019-02-23 | End: 2019-03-25

## 2019-02-23 NOTE — LETTER
[facility] EMERGENCY DEPARTMENT  [address] [address]  Phone: [phone]  Fax: [phone]    February 23, 2019        Leah Marie Schoenecker  [address] [place] [id number]          To whom it may concern:    RE: Leah Marie Schoenecker Leah was evaluated in the urgent care for symptoms on 2/23/19.  She may return to minimal activity only as tolerated by her symptoms.      Please contact me for questions or concerns.      Sincerely,        Saniya Pacheco PA-C

## 2019-02-23 NOTE — ED AVS SNAPSHOT
[facility] Emergency Department  [address] [facility] [address]  Phone:  [phone]  Fax:  [phone]                                    Leah Marie Schoenecker   MRN: [id number]    Department:  [facility] Emergency Department   Date of Visit:  2/23/2019           After Visit Summary Signature Page    I have received my discharge instructions, and my questions have been answered. I have discussed any challenges I see with this plan with the nurse or doctor.    ..........................................................................................................................................  Patient/Patient Representative Signature      ..........................................................................................................................................  Patient Representative Print Name and Relationship to Patient    ..................................................               ................................................  Date                                   Time    ..........................................................................................................................................  Reviewed by Signature/Title    ...................................................              ..............................................  Date                                               Time          22EPIC Rev 08/18

## 2019-02-23 NOTE — LETTER
[facility] EMERGENCY DEPARTMENT  [address] [address]  Phone: [phone]  Fax: [phone]    February 23, 2019        Leah Marie Schoenecker  [address] [place] [phone]          To whom it may concern:    RE: Leah Marie Schoenecker    Male was evaluated in the urgent care for symptoms on 2/23/19.  She may return to minimal activity only as tolerated by her symptoms.      Please contact me for questions or concerns.      Sincerely,        Saniya Pacheco PA-C

## 2019-02-24 NOTE — ED PROVIDER NOTES
History     Chief Complaint   Patient presents with     Shortness of Breath     Cough     HPI  Leah Marie Schoenecker is a 23 year old female who presents to the urgent care over chest pain which developed earlier today while she was at work.  She has had URI symptoms for the last week including sore throat, nasal congestion, postnasal drainage, cough, shortness of breath and concerns for dizziness.  She has had chills, myalgias however no objective fever was measured.  Beginning earlier today she noted increasing discomfort in the lower sternal region of her chest which is exacerbated by coughing, deep breathing and with movement. She did not have any recent trauma to the chest or abdomen.  She denies any nausea, vomiting, diarrhea, abdominal pain, lower extremity swelling/edema.  She has not had any recent surgeries or periods of prolonged inactivity.  She has not had any known cancer history.  She is on oral birth control pill.  She did schedule a follow up appointment in the clinic which is scheduled for three days from now.       Allergies:  No Known Allergies    Problem List:    Patient Active Problem List    Diagnosis Date Noted     Mild major depression (H) 10/02/2012     Priority: Medium     Anxiety 10/02/2012     Priority: Medium      Past Medical History:    History reviewed. No pertinent past medical history.    Past Surgical History:    Past Surgical History:   Procedure Laterality Date     COLONOSCOPY N/A 6/10/2015    Procedure: COLONOSCOPY;  Surgeon: Jose Leroy MD;  Location: [facility]     NO HISTORY OF SURGERY       Family History:    Family History   Problem Relation Age of Onset     Hypertension Maternal Grandmother      Diabetes Paternal Grandfather      Heart Disease Paternal Grandfather      Other - See Comments Sister         endometrises      Hypertension Mother      Other - See Comments Sister         endometrioses      Thyroid Disease Maternal Aunt      Social History:  Marital  Status:  Single [1]  Social History     Tobacco Use     Smoking status: Never Smoker     Smokeless tobacco: Never Used   Substance Use Topics     Alcohol use: Yes     Comment: monthly at most     Drug use: No      Medications:      FALMINA 0.1-20 MG-MCG tablet   ibuprofen (ADVIL,MOTRIN) 800 MG tablet     Review of Systems  CONSTITUTIONAL:POSITIVE  for chills, myalgias and NEGATIVE  for fever  INTEGUMENTARY/SKIN: NEGATIVE for worrisome rashes, moles or lesions  EYES: NEGATIVE for vision changes or irritation  ENT/MOUTH: POSITIVE for sore throat, nasal congestion, sinus pressure,  Post-nasal drainage and NEGATIVE for ear pain   RESP:POSITIVE for cough, shortness of breath and NEGATIVE for wheezing   GI:NEGATIVE for abdominal pain, diarrhea, nausea and vomiting  NEURO:  POSITIVE for headache, dizziness NEGATIVE for numbness, weakness, speech changes   Physical Exam   BP: 132/86  Pulse: 71  Temp: 98.7  F (37.1  C)  Resp: 16  SpO2: 100 %  Physical Exam  GENERAL APPEARANCE: healthy, alert and no distress  EYES: EOMI,  PERRL, conjunctiva clear  HENT: ear canals and TM's normal.  Nasal mucosa moist.  Posterior pharynx is nonerythematous without exudate  NECK: supple, nontender, no lymphadenopathy  RESP: lungs clear to auscultation - no rales, rhonchi or wheezes, there is tenderness to rotation of the anterior lower chest wall/xiphoid process.  Pain is reproducible with movement, palpation  CV: regular rates and rhythm, normal S1 S2, no murmur noted  ABDOMEN:  soft, nontender, no HSM or masses and bowel sounds normal  NEURO: Normal strength and tone, sensory exam grossly normal,  normal speech and mentation, CN III-XII grossly intact, reflexes of upper and lower extremities equal bilaterally,normal finger nose finger testing   SKIN: no suspicious lesions or rashes  ED Course        Procedures          Critical Care time:  none            Results for orders placed or performed during the hospital encounter of 02/23/19 (from  the past 24 hour(s))   Chest XR,  PA & LAT    Narrative    XR CHEST 2 VW   2/23/2019 8:33 PM     HISTORY: cough, shortness of breath    COMPARISON: Film dated 7/26/2013    FINDINGS: The heart is negative.  The lungs are clear. The pulmonary  vasculature is normal.  The bones and soft tissues are unremarkable.      Impression    IMPRESSION: No active infiltrates.        MICHELLE TRIPATHI MD   Rapid strep group A screen POCT   Result Value Ref Range    Rapid Strep A Screen Negative neg    Internal QC OK Yes    Beta strep group A culture   Result Value Ref Range    Specimen Description Throat     Culture Micro Culture negative < 24 hours, reincubate      Medications - No data to display    Assessments & Plan (with Medical Decision Making)     I have reviewed the nursing notes.    I have reviewed the findings, diagnosis, plan and need for follow up with the patient.          Medication List      There are no discharge medications for this visit.       Final diagnoses:   Viral URI with cough   Chest pain     23-year-old female presents to the urgent care with concern over 1 week history of URI symptoms including nasal congestion, sore throat, cough, shortness of breath, dizziness with concerns for increased chest pressure/pain which developed earlier today while at work.  She had stable vital signs upon arrival.  Physical exam findings as described above were significant for tenderness palpation of the xiphoid process, lower sternal border.  She did have testing including negative rapid strep test with culture pending at time of discharge.  Chest x-ray was negative for acute infiltrate, pneumothorax, pleural effusion or change in cardiopulmonary vasculature.  Would consider possibility of influenza however given duration of symptoms will defer testing at this time.  Chest pain is most consistent with mechanical chest wall pain secondary to coughing.  I do not suspect pertussis, PE, pericarditis, myocarditis.  I did discuss  risk/benefits of additional evaluation and patient agreed to defer.  She was discharged home stable with instructions for continued symptomatic treatment with rest, ice, tylenol/ibuprofen. Follow up with PCP if no improvement in 3 days as scheduled.  Worrisome reasons to return to ER/UC sooner discussed.      Disclaimer: This note consists of symbols derived from keyboarding, dictation, and/or voice recognition software. As a result, there may be errors in the script that have gone undetected.  Please consider this when interpreting information found in the chart.    2/23/2019   [facility] EMERGENCY DEPARTMENT     Saniya Pacheco PA-C  02/24/19 [id number]

## 2019-02-25 NOTE — PROGRESS NOTES
SUBJECTIVE:   Leah Marie Schoenecker is a 23 year old female who presents to clinic today for the following health issues:    ENT Symptoms             Symptoms: cc Present Absent Comment   Fever/Chills   x    Fatigue  x     Muscle Aches  x     Eye Irritation  x     Sneezing   x    Nasal Jose Antonio/Drg  x     Sinus Pressure/Pain x      Loss of smell  x     Dental pain  x     Sore Throat  x  Strep test 2/23/2019 - negative    Swollen Glands   x    Ear Pain/Fullness  x     Cough  x     Wheeze   x    Chest Pain   x    Shortness of breath  x     Rash   x    Other   x      Symptom duration:  1 week - getting worse    Symptom severity:  mild    Treatments tried:  tessalon and albuterol inhaler, OTC cold   Contacts:  none      Contraception - needs refill of oral contraception   No personal or family history of blood clots, stroke, migraine with aura or breast cancer.  Does not use tobacco.    URINARY TRACT SYMPTOMS      Duration: Oct 2018    Description  odor and dark urine     Intensity:  mild    Accompanying signs and symptoms:  Fever/chills: no   Flank pain no   Nausea and vomiting: no   Vaginal symptoms: none  Abdominal/Pelvic Pain: no     History  History of frequent UTI's: no   History of kidney stones: no   Sexually Active: no   Possibility of pregnancy: No    Precipitating or alleviating factors: None    Therapies tried and outcome: increased fluids      Resolved with increased fluids due to illness  No vaginal symptoms    Problem list and histories reviewed & adjusted, as indicated.  Additional history: as documented    Patient Active Problem List   Diagnosis     Mild major depression (H)     Anxiety     Past Surgical History:   Procedure Laterality Date     COLONOSCOPY N/A 6/10/2015    Procedure: COLONOSCOPY;  Surgeon: Jose Leroy MD;  Location: [facility]     NO HISTORY OF SURGERY         Social History     Tobacco Use     Smoking status: Never Smoker     Smokeless tobacco: Never Used   Substance Use Topics  "    Alcohol use: Yes     Comment: monthly at most     Family History   Problem Relation Age of Onset     Hypertension Maternal Grandmother      Diabetes Paternal Grandfather      Heart Disease Paternal Grandfather      Other - See Comments Sister         endometrises      Hypertension Mother      Other - See Comments Sister         endometrioses      Thyroid Disease Maternal Aunt          Current Outpatient Medications   Medication Sig Dispense Refill     albuterol (PROAIR HFA/PROVENTIL HFA/VENTOLIN HFA) 108 (90 Base) MCG/ACT inhaler Inhale 2 puffs into the lungs every 6 hours as needed for shortness of breath / dyspnea or wheezing 1 Inhaler 0     benzonatate (TESSALON) 100 MG capsule Take 2 capsules (200 mg) by mouth 3 times daily as needed for cough 42 capsule 0     FALMINA 0.1-20 MG-MCG tablet TAKE ONE TABLET BY MOUTH ONE TIME DAILY 28 tablet 0     ibuprofen (ADVIL/MOTRIN) 200 MG capsule Take 200 mg by mouth every 4 hours as needed for fever       No Known Allergies  Labs reviewed in EPIC    Reviewed and updated as needed this visit by clinical staff       Reviewed and updated as needed this visit by Provider         ROS:  Constitutional, HEENT, cardiovascular, pulmonary, gi and gu systems are negative, except as otherwise noted.    OBJECTIVE:     /70 (Cuff Size: Adult Large)   Pulse 109   Temp 99.5  F (37.5  C) (Tympanic)   Resp 18   Ht 1.702 m (5' 7\")   Wt 92.1 kg (203 lb)   SpO2 97%   BMI 31.79 kg/m    Body mass index is 31.79 kg/m .  GENERAL: healthy, alert and no distress  HENT: normal cephalic/atraumatic, both ears: clear effusion, nose and mouth without ulcers or lesions, oropharynx clear, oral mucous membranes moist and sinuses: maxillary tenderness on bilateral  NECK: no adenopathy  RESP: lungs clear to auscultation - no rales, rhonchi or wheezes  CV: regular rate and rhythm, normal S1 S2, no S3 or S4, no murmur, click or rub  BACK: no CVA tenderness, no paralumbar tenderness  PSYCH: " mentation appears normal, affect normal/bright    Diagnostic Test Results:  none     ASSESSMENT/PLAN:     1. Encounter for surveillance of contraceptive pills  Happy with current oral contraceptive.  Risks and benefits of oral contraceptives discussed including but not limited to increased risk of thrombolytic events including blood clot and stroke and certain female cancers.  Angie verbalized understanding of risks.  - levonorgestrel-ethinyl estradiol (FALMINA) 0.1-20 MG-MCG tablet; Take 1 tablet by mouth daily  Dispense: 84 tablet; Refill: 4    2. Acute sinusitis with symptoms > 10 days  With ongoing symptoms Augmentin sent to the pharmacy.  Symptomatic care and follow up discussed.  - amoxicillin-clavulanate (AUGMENTIN) 875-125 MG tablet; Take 1 tablet by mouth 2 times daily for 10 days  Dispense: 20 tablet; Refill: 0    3. Mild major depression (H)  Phq-9 elevated today.  Increased stress with buying a new house and illness.     4. Dysuria  Resolved.  Symptomatic care and follow up discussed.    5. Screening STI  -Chlamydia screening completed today    Home care instructions were reviewed with the patient. The risks, benefits and treatment options of prescribed medications or other treatments have been discussed with the patient. The patient verbalized their understanding and should call or follow up if no improvement or if they develop further problems.    Patient Instructions   You are due for a physical, please schedule at your earliest convenience.    Augmentin sent to the pharmacy for symptoms    Continue to push fluids    Follow up if symptoms do not improve or worsen.    Patient Education     Sinusitis (Antibiotic Treatment)    The sinuses are air-filled spaces within the bones of the face. They connect to the inside of the nose. Sinusitis is an inflammation of the tissue that lines the sinuses. Sinusitis can occur during a cold. It can also happen due to allergies to pollens and other particles in the  air. Sinusitis can cause symptoms of sinus congestion and a feeling of fullness. A sinus infection causes fever, headache, and facial pain. There is often green or yellow fluid draining from the nose or into the back of the throat (post-nasal drip). You have been given antibiotics to treat this condition.  Home care    Take the full course of antibiotics as instructed. Do not stop taking them, even when you feel better.    Drink plenty of water, hot tea, and other liquids. This may help thin nasal mucus. It also may help your sinuses drain fluids.    Heat may help soothe painful areas of your face. Use a towel soaked in hot water. Or,  the shower and direct the warm spray onto your face. Using a vaporizer along with a menthol rub at night may also help soothe symptoms.     An expectorant with guaifenesin may help thin nasal mucus and help your sinuses drain fluids.    You can use an over-the-counter decongestant, unless a similar medicine was prescribed to you. Nasal sprays work the fastest. Use one that contains phenylephrine or oxymetazoline. First blow your nose gently. Then use the spray. Do not use these medicines more often than directed on the label. If you do, your symptoms may get worse. You may also take pills that contain pseudoephedrine. Don t use products that combine multiple medicines. This is because side effects may be increased. Read labels. You can also ask the pharmacist for help. (People with high blood pressure should not use decongestants. They can raise blood pressure.)    Over-the-counter antihistamines may help if allergies contributed to your sinusitis.      Do not use nasal rinses or irrigation during an acute sinus infection, unless your healthcare provider tells you to. Rinsing may spread the infection to other areas in your sinuses.    Use acetaminophen or ibuprofen to control pain, unless another pain medicine was prescribed to you. If you have chronic liver or kidney disease  or ever had a stomach ulcer, talk with your healthcare provider before using these medicines. (Aspirin should never be taken by anyone under age 18 who is ill with a fever. It may cause severe liver damage.)    Don't smoke. This can make symptoms worse.  Follow-up care  Follow up with your healthcare provider or our staff if you are better in 1 week.  When to seek medical advice  Call your healthcare provider if any of these occur:    Facial pain or headache that gets worse    Stiff neck    Unusual drowsiness or confusion    Swelling of your forehead or eyelids    Vision problems, such as blurred or double vision    Fever of 100.4 F (38 C) or higher, or as directed by your healthcare provider    Seizure    Breathing problems    Symptoms don't go away in 10 days  Prevention  Here are steps you can take to help prevent an infection:    Keep good hand washing habits.    Don t have close contact with people who have sore throats, colds, or other upper respiratory infections.    Don t smoke, and stay away from secondhand smoke.    Stay up to date with of your vaccines.  Date Last Reviewed: 11/1/2017 2000-2018 [employer]. [address] [place] [address], [place] [address]. All rights reserved. This information is not intended as a substitute for professional medical care. Always follow your healthcare professional's instructions.               LUISA Enamorado [facility]

## 2019-02-26 ENCOUNTER — OFFICE VISIT (OUTPATIENT)
Dept: FAMILY MEDICINE | Facility: CLINIC | Age: 24
End: 2019-02-26
Payer: COMMERCIAL

## 2019-02-26 VITALS
BODY MASS INDEX: 31.86 KG/M2 | WEIGHT: 203 LBS | SYSTOLIC BLOOD PRESSURE: 110 MMHG | HEART RATE: 109 BPM | DIASTOLIC BLOOD PRESSURE: 70 MMHG | HEIGHT: 67 IN | RESPIRATION RATE: 18 BRPM | TEMPERATURE: 99.5 F | OXYGEN SATURATION: 97 %

## 2019-02-26 DIAGNOSIS — R30.0 DYSURIA: ICD-10-CM

## 2019-02-26 DIAGNOSIS — Z11.3 ROUTINE SCREENING FOR STI (SEXUALLY TRANSMITTED INFECTION): ICD-10-CM

## 2019-02-26 DIAGNOSIS — J01.90 ACUTE SINUSITIS WITH SYMPTOMS > 10 DAYS: ICD-10-CM

## 2019-02-26 DIAGNOSIS — Z30.41 ENCOUNTER FOR SURVEILLANCE OF CONTRACEPTIVE PILLS: Primary | ICD-10-CM

## 2019-02-26 DIAGNOSIS — F32.0 MILD MAJOR DEPRESSION (H): ICD-10-CM

## 2019-02-26 PROCEDURE — 99214 OFFICE O/P EST MOD 30 MIN: CPT | Performed by: NURSE PRACTITIONER

## 2019-02-26 PROCEDURE — 87491 CHLMYD TRACH DNA AMP PROBE: CPT | Performed by: NURSE PRACTITIONER

## 2019-02-26 RX ORDER — LEVONORGESTREL/ETHIN.ESTRADIOL 0.1-0.02MG
1 TABLET ORAL DAILY
Qty: 84 TABLET | Refills: 4 | Status: SHIPPED | OUTPATIENT
Start: 2019-02-26 | End: 2020-04-24

## 2019-02-26 RX ORDER — OMEGA-3 FATTY ACIDS/FISH OIL 300-1000MG
200 CAPSULE ORAL EVERY 4 HOURS PRN
COMMUNITY

## 2019-02-26 ASSESSMENT — PATIENT HEALTH QUESTIONNAIRE - PHQ9
5. POOR APPETITE OR OVEREATING: SEVERAL DAYS
SUM OF ALL RESPONSES TO PHQ QUESTIONS 1-9: 6

## 2019-02-26 ASSESSMENT — ANXIETY QUESTIONNAIRES
3. WORRYING TOO MUCH ABOUT DIFFERENT THINGS: MORE THAN HALF THE DAYS
2. NOT BEING ABLE TO STOP OR CONTROL WORRYING: MORE THAN HALF THE DAYS
1. FEELING NERVOUS, ANXIOUS, OR ON EDGE: MORE THAN HALF THE DAYS
6. BECOMING EASILY ANNOYED OR IRRITABLE: NEARLY EVERY DAY
5. BEING SO RESTLESS THAT IT IS HARD TO SIT STILL: SEVERAL DAYS
7. FEELING AFRAID AS IF SOMETHING AWFUL MIGHT HAPPEN: SEVERAL DAYS
GAD7 TOTAL SCORE: 12

## 2019-02-26 ASSESSMENT — MIFFLIN-ST. JEOR: SCORE: 1703.43

## 2019-02-26 NOTE — RESULT ENCOUNTER NOTE
Final Beta strep group A r/o culture is NEGATIVE for Group A streptococcus.    No treatment or change in treatment per [facility] Strep protocol.

## 2019-02-26 NOTE — NURSING NOTE
"Chief Complaint   Patient presents with     Contraception     Cough     Urinary Problem       Initial /70 (Cuff Size: Adult Large)   Pulse 109   Temp 99.5  F (37.5  C) (Tympanic)   Resp 18   Ht 1.702 m (5' 7\")   Wt 92.1 kg (203 lb)   SpO2 97%   BMI 31.79 kg/m   Estimated body mass index is 31.79 kg/m  as calculated from the following:    Height as of this encounter: 1.702 m (5' 7\").    Weight as of this encounter: 92.1 kg (203 lb).    Patient presents to the clinic using No DME    Health Maintenance that is potentially due pending provider review:  Pap Smear and Chlamydia screening    Pt will schedule physical appt.    Katie Wilson CMA      "

## 2019-02-26 NOTE — LETTER
February 26, 2019      Leah Marie Schoenecker  [address] [place] [facility] [address]        To Whom It May Concern:    Leah Marie Schoenecker was seen in our clinic. She missed work 2/27/2019 due to illness.      Sincerely,        LUISA Enamorado CNP

## 2019-02-26 NOTE — PATIENT INSTRUCTIONS
You are due for a physical, please schedule at your earliest convenience.    Augmentin sent to the pharmacy for symptoms    Continue to push fluids    Follow up if symptoms do not improve or worsen.    Patient Education     Sinusitis (Antibiotic Treatment)    The sinuses are air-filled spaces within the bones of the face. They connect to the inside of the nose. Sinusitis is an inflammation of the tissue that lines the sinuses. Sinusitis can occur during a cold. It can also happen due to allergies to pollens and other particles in the air. Sinusitis can cause symptoms of sinus congestion and a feeling of fullness. A sinus infection causes fever, headache, and facial pain. There is often green or yellow fluid draining from the nose or into the back of the throat (post-nasal drip). You have been given antibiotics to treat this condition.  Home care    Take the full course of antibiotics as instructed. Do not stop taking them, even when you feel better.    Drink plenty of water, hot tea, and other liquids. This may help thin nasal mucus. It also may help your sinuses drain fluids.    Heat may help soothe painful areas of your face. Use a towel soaked in hot water. Or,  the shower and direct the warm spray onto your face. Using a vaporizer along with a menthol rub at night may also help soothe symptoms.     An expectorant with guaifenesin may help thin nasal mucus and help your sinuses drain fluids.    You can use an over-the-counter decongestant, unless a similar medicine was prescribed to you. Nasal sprays work the fastest. Use one that contains phenylephrine or oxymetazoline. First blow your nose gently. Then use the spray. Do not use these medicines more often than directed on the label. If you do, your symptoms may get worse. You may also take pills that contain pseudoephedrine. Don t use products that combine multiple medicines. This is because side effects may be increased. Read labels. You can also ask  the pharmacist for help. (People with high blood pressure should not use decongestants. They can raise blood pressure.)    Over-the-counter antihistamines may help if allergies contributed to your sinusitis.      Do not use nasal rinses or irrigation during an acute sinus infection, unless your healthcare provider tells you to. Rinsing may spread the infection to other areas in your sinuses.    Use acetaminophen or ibuprofen to control pain, unless another pain medicine was prescribed to you. If you have chronic liver or kidney disease or ever had a stomach ulcer, talk with your healthcare provider before using these medicines. (Aspirin should never be taken by anyone under age 18 who is ill with a fever. It may cause severe liver damage.)    Don't smoke. This can make symptoms worse.  Follow-up care  Follow up with your healthcare provider or our staff if you are better in 1 week.  When to seek medical advice  Call your healthcare provider if any of these occur:    Facial pain or headache that gets worse    Stiff neck    Unusual drowsiness or confusion    Swelling of your forehead or eyelids    Vision problems, such as blurred or double vision    Fever of 100.4 F (38 C) or higher, or as directed by your healthcare provider    Seizure    Breathing problems    Symptoms don't go away in 10 days  Prevention  Here are steps you can take to help prevent an infection:    Keep good hand washing habits.    Don t have close contact with people who have sore throats, colds, or other upper respiratory infections.    Don t smoke, and stay away from secondhand smoke.    Stay up to date with of your vaccines.  Date Last Reviewed: 11/1/2017 2000-2018 [employer]. [address] [place] [address], [place] [address]. All rights reserved. This information is not intended as a substitute for professional medical care. Always follow your healthcare professional's instructions.

## 2019-02-27 LAB
BACTERIA SPEC CULT: NO GROWTH
C TRACH DNA SPEC QL NAA+PROBE: NEGATIVE
SPECIMEN SOURCE: NORMAL
SPECIMEN SOURCE: NORMAL

## 2019-02-27 ASSESSMENT — ANXIETY QUESTIONNAIRES: GAD7 TOTAL SCORE: 12

## 2019-08-21 ENCOUNTER — TELEPHONE (OUTPATIENT)
Dept: FAMILY MEDICINE | Facility: CLINIC | Age: 24
End: 2019-08-21

## 2019-08-21 NOTE — TELEPHONE ENCOUNTER
Panel Management Review      Patient has the following on her problem list: None      Composite cancer screening  Chart review shows that this patient is due/due soon for the following Pap Smear  Summary:    Patient is due/failing the following:   PAP    Action needed:   Patient needs office visit for physical .    Type of outreach:    unable to leave message     Questions for provider review:    None                                                                                                                                    Anjali Klein [facility]      Chart routed to Care Team .

## 2019-10-02 ENCOUNTER — HEALTH MAINTENANCE LETTER (OUTPATIENT)
Age: 24
End: 2019-10-02

## 2019-12-19 ENCOUNTER — MYC MEDICAL ADVICE (OUTPATIENT)
Dept: FAMILY MEDICINE | Facility: CLINIC | Age: 24
End: 2019-12-19

## 2019-12-23 NOTE — TELEPHONE ENCOUNTER
LM to [facility]. Same Day provider canceled. Left detailed message to [facility] regarding her appointment cancellation and triaging. Anjali Joseph RN

## 2020-01-03 ENCOUNTER — MYC MEDICAL ADVICE (OUTPATIENT)
Dept: FAMILY MEDICINE | Facility: CLINIC | Age: 25
End: 2020-01-03

## 2020-01-08 ENCOUNTER — OFFICE VISIT (OUTPATIENT)
Dept: FAMILY MEDICINE | Facility: CLINIC | Age: 25
End: 2020-01-08
Payer: COMMERCIAL

## 2020-01-08 ENCOUNTER — ANCILLARY PROCEDURE (OUTPATIENT)
Dept: GENERAL RADIOLOGY | Facility: CLINIC | Age: 25
End: 2020-01-08
Attending: NURSE PRACTITIONER
Payer: COMMERCIAL

## 2020-01-08 VITALS
RESPIRATION RATE: 16 BRPM | TEMPERATURE: 97.7 F | SYSTOLIC BLOOD PRESSURE: 120 MMHG | BODY MASS INDEX: 28.56 KG/M2 | DIASTOLIC BLOOD PRESSURE: 70 MMHG | WEIGHT: 182 LBS | HEART RATE: 72 BPM | HEIGHT: 67 IN

## 2020-01-08 DIAGNOSIS — M54.50 ACUTE MIDLINE LOW BACK PAIN WITHOUT SCIATICA: Primary | ICD-10-CM

## 2020-01-08 DIAGNOSIS — Z23 NEED FOR PROPHYLACTIC VACCINATION AND INOCULATION AGAINST INFLUENZA: ICD-10-CM
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
## 2020-01-08 DIAGNOSIS — M54.50 ACUTE MIDLINE LOW BACK PAIN WITHOUT SCIATICA: ICD-10-CM

## 2020-01-08 DIAGNOSIS — S06.0X0A CONCUSSION WITHOUT LOSS OF CONSCIOUSNESS, INITIAL ENCOUNTER: ICD-10-CM

## 2020-01-08 PROCEDURE — 99214 OFFICE O/P EST MOD 30 MIN: CPT | Mod: 25 | Performed by: NURSE PRACTITIONER

## 2020-01-08 PROCEDURE — 90686 IIV4 VACC NO PRSV 0.5 ML IM: CPT | Performed by: NURSE PRACTITIONER

## 2020-01-08 PROCEDURE — 90471 IMMUNIZATION ADMIN: CPT | Performed by: NURSE PRACTITIONER

## 2020-01-08 PROCEDURE — 72100 X-RAY EXAM L-S SPINE 2/3 VWS: CPT

## 2020-01-08 RX ORDER — CYCLOBENZAPRINE HCL 5 MG
5-10 TABLET ORAL 3 TIMES DAILY PRN
Qty: 30 TABLET | Refills: 0 | Status: SHIPPED | OUTPATIENT
Start: 2020-01-08 | End: 2023-06-06

## 2020-01-08 ASSESSMENT — MIFFLIN-ST. JEOR: SCORE: 1603.18

## 2020-01-08 NOTE — PROGRESS NOTES
1/8/2020  SUBJECTIVE:  Angie is a 24 year old female who presents for evaluation of a possible concussion.     Head injury occurred 12/22/2019   Mechanism of injury: Fall   Immediate symptoms at time of injury: no LOC, no problems with memory (patient is able to remember events before as well as after the injury), headache, sleep disturbance, loss of appetite, noise sensitivity, poor concentration, dizziness, confusion  Treatment to date:  This is the first patient visit for this problem   Level of activity to date is:  Stage 3 - moderately aggressive.    Prior concussion history:  No  Prior concussion date:  NA    Current Symptoms:   Headache or  pressure  in head 4 - Moderate to Severe   Upset stomach or throwing up  0 - No symptoms   Problems with balance  0 - No symptoms   Feeling dizzy  0 - No symptoms   Sensitivity to light 0 - No symptoms   Sensitivity to noise  2 - Mild to Moderate   Mood changes  0 - No symptoms   Feeling sluggish, hazy or foggy  1 - Mild   Trouble concentrating, lack of focus 3 - Moderate   Motion sickness  0 - No symptoms   Vision changes  0 - No symptoms   Memory problems  4 - Moderate to Severe-walking into a room and not remembering why she is there   Feeling confused  4 - Moderate to Severe-walking into a room and not remembering why she is there   Neck pain 1 - Mild   Trouble sleeping 1 - Mild   Symptom Score at this visit:  20    Not as often headaches now-dull ache now  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[place]-working seems to make things worse    Sleep: pain in back causing poor sleep    Past pertinent history: Migraines: no     Depression: Yes:      Anxiety: Yes:      Learning disability: no     ADHD: no    Back Pain       Duration: 12/22/2019        Specific cause: fall     Description:   Location of pain: low back middle   Character of pain: dull ache  Pain radiation:none  New numbness or weakness in legs, not attributed to pain:  no     Intensity:  "mild    History:   Pain interferes with job: YES  History of back problems: recurrent self limited episodes of low back pain in the past  Any previous MRI or X-rays: X-ray at chiropractor 1/2/2019  Sees a specialist for back pain:  No  Therapies tried without relief: none     Alleviating factors:   Improved by: chiropractor      Precipitating factors:  Worsened by: walking, moving, active, first thing in AM       Accompanying Signs & Symptoms:  Risk of Fracture:  Recent history of trauma or blunt force  Risk of Cauda Equina:  None  Risk of Infection:  None  Risk of Cancer:  None  Risk of Ankylosing Spondylitis:  Onset at age <35, male, AND morning back stiffness. no     History reviewed. No pertinent past medical history.    Patient Active Problem List   Diagnosis     Mild major depression (H)     Anxiety        Tobacco  Allergies  Meds  Problems  Med Hx  Surg Hx  Fam Hx         Review of Systems   ROS COMP: Constitutional, HEENT, cardiovascular, pulmonary, gi and gu systems are negative, except as otherwise noted.      Objective    /70 (Cuff Size: Adult Regular)   Pulse 72   Temp 97.7  F (36.5  C) (Tympanic)   Resp 16   Ht 1.702 m (5' 7\")   Wt 82.6 kg (182 lb)   BMI 28.51 kg/m    Body mass index is 28.51 kg/m .  Physical Exam   GENERAL: healthy, alert and no distress  EYES: Eyes grossly normal to inspection, PERRL and conjunctivae and sclerae normal  NECK: no adenopathy, no asymmetry, masses, or scars and thyroid normal to palpation  RESP: lungs clear to auscultation - no rales, rhonchi or wheezes  CV: regular rate and rhythm, normal S1 S2, no S3 or S4, no murmur, click or rub, no peripheral edema and peripheral pulses strong  ABDOMEN: soft, nontender, no hepatosplenomegaly, no masses and bowel sounds normal  MS: tenderness to palpation mid line lumbar  NEURO: Normal strength and tone, sensory exam grossly normal, mentation intact and cranial nerves 2-12 intact  PSYCH: mentation appears normal, " affect normal/bright    Diagnostic Test Results:  Xray - LUMBAR SPINE TWO TO THREE VIEWS   1/8/2020 10:34 AM      HISTORY: Acute midline low back pain without sciatica. Fall, initial  encounter.     COMPARISON: 4/30/2015                                                                      IMPRESSION: No fracture is identified. No high-grade degenerative disc  disease or facet arthropathy. Paraspinal soft tissues are  unremarkable.     GERRI FLORES MD        Assessment & Plan     1. Acute midline low back pain without sciatica  X ray unremarkable.  Flexeril sent to the pharmacy for symptoms, risks of medication discussed.  Recommend time off work or decreased hours due to worsened symptoms with working-Angie will consider this.  Consider ortho concussion referral, declined today.  - XR Lumbar Spine 2/3 Views; Future  - cyclobenzaprine (FLEXERIL) 5 MG tablet; Take 1-2 tablets (5-10 mg) by mouth 3 times daily as needed for muscle spasms  Dispense: 30 tablet; Refill: 0    2. Fall, initial encounter  Per above.   - XR Lumbar Spine 2/3 Views; Future    3. Concussion without loss of consciousness, initial encounter  Per above.     4. Need for prophylactic vaccination and inoculation against influenza    - INFLUENZA VACCINE IM > 6 MONTHS VALENT IIV4 [81659]  - Vaccine Administration, Initial [51696]     Home care instructions were reviewed with the patient. The risks, benefits and treatment options of prescribed medications or other treatments have been discussed with the patient. The patient verbalized their understanding and should call or follow up if no improvement or if they develop further problems.    Patient Instructions   Try the Flexeril as needed for muscle relaxation      Healing After a Concussion     Rest  Rest is the best treatment for a concussion. You should avoid activities that cause your symptoms to get worse or make you feel tired. This would include physical activities as well as watching TV, texting  or playing video games.    You may sleep or nap during the day as long as it does not prevent you from sleeping at night. If you find it is hard to fall asleep, talk to your doctor. You may need medicine to help you sleep.    If symptoms have not worsened, you do not need to be wakened and checked on during the night.      School  You can rest your brain by staying at home for a time. The amount of time away from school will depend on the injury and the symptoms.    At school, you may have trouble taking tests or working on a computer. Symptoms may get worse in band, choir, busy classes or a noisy lunchroom. A doctor can work with the school if you need a plan to help you succeed.    Work  You may need to change your work routine as you recover. A doctor can help you create a plan for the conditions at your job.      Treat pain    Take Tylenol (acetaminophen) for headaches and pain every 4 to 6 hours, as needed.    Do not take over-the-counter medicines such as ibuprofen, Advil, Motrin, Benadryl, Aleve, sleep aides or Tylenol PM. These drugs may cause new problems.    If you cannot manage your pain with Tylenol, call your doctor or go to the emergency department.      Watch symptoms closely  Each day keep track of your symptoms. This will help your doctor see how well you are healing. Write down the symptom, how often it occurs, how long it lasts, and what makes it better or worse.    Possible symptoms: headache, stomach upset, feeling confused or dizzy, motion sickness, and personality changes.      Returning to activity  Take your time returning to activity. A doctor can help determine what levels of activity are best for you. If you re returning to a sport, you should see a healthcare provider before doing so.      If you have questions, call  Concussion hotline: 909.599.6708 or Athletic medicine hotline: 702.625.9499.          For informational purposes only.  Not to replace the advice of your health care  "provider.  Copyright   2014 [facility].  All rights reserved.            Sleep Hygiene     What is it?    \"Sleep hygiene\" means having good sleep habits. Follow the tips below to sleep better at night.      Get on a schedule. Go to bed and get up at about the same time every day.    Listen to your body. Only try to sleep when you actually feel tired or sleepy.    Be patient. If you haven't been able to get to sleep after about 20 minutes or more, get up and do something calming or boring until you feel sleepy. Then, return to bed and try again.      Avoid caffeine (coffee, tea, cola drinks, chocolate and some medicines) for at least 4 to 6 hours before going to bed. We also suggest you don't use alcohol or nicotine (cigarettes) during this time. Both can make it harder for you to fall asleep and stay asleep.    Use your bed for sleeping only. That means no TV, computer or homework in bed!    Don't nap during the day. If you do nap, make sure it is for less than an hour and before 3 p.m.    Create sleep rituals that remind your body that it is time to sleep. Examples include breathing exercises, stretching, or reading a book.     Try a bath or shower before bed. Having a hot bath 1 to 2 hours before bedtime can help you feel sleepy.    Don't watch the clock. Checking the clock during the night can wake you up. It can also lead to negative thoughts such as \"I will never fall asleep.\"    Use a sleep diary. Track your sleep schedule to know your sleep patterns and to see where you can improve.    Get regular exercise. But try not to do heavy exercise in the 4 hours before bedtime.      Eat a healthy, balanced diet. Try eating a light, healthy snack before bed, but avoid eating a heavy meal.    Create the right sleeping area. A cool, dark, quiet room is best. If needed, try earplugs, fans and blackout curtains.      Keep your daytime routine the same even if you have a bad night sleep. Avoiding activities " the next day can make it harder to sleep.          For informational purposes only. Not to replace the advice of your health care provider. Copyright   2013 [facility]. All rights reserved.        Return in about 1 week (around 1/15/2020), or if symptoms worsen or fail to improve.    LUISA Hernandez [facility]

## 2020-01-08 NOTE — PATIENT INSTRUCTIONS
Try the Flexeril as needed for muscle relaxation      Healing After a Concussion     Rest  Rest is the best treatment for a concussion. You should avoid activities that cause your symptoms to get worse or make you feel tired. This would include physical activities as well as watching TV, texting or playing video games.    You may sleep or nap during the day as long as it does not prevent you from sleeping at night. If you find it is hard to fall asleep, talk to your doctor. You may need medicine to help you sleep.    If symptoms have not worsened, you do not need to be wakened and checked on during the night.      School  You can rest your brain by staying at home for a time. The amount of time away from school will depend on the injury and the symptoms.    At school, you may have trouble taking tests or working on a computer. Symptoms may get worse in band, choir, busy classes or a noisy lunchroom. A doctor can work with the school if you need a plan to help you succeed.    Work  You may need to change your work routine as you recover. A doctor can help you create a plan for the conditions at your job.      Treat pain    Take Tylenol (acetaminophen) for headaches and pain every 4 to 6 hours, as needed.    Do not take over-the-counter medicines such as ibuprofen, Advil, Motrin, Benadryl, Aleve, sleep aides or Tylenol PM. These drugs may cause new problems.    If you cannot manage your pain with Tylenol, call your doctor or go to the emergency department.      Watch symptoms closely  Each day keep track of your symptoms. This will help your doctor see how well you are healing. Write down the symptom, how often it occurs, how long it lasts, and what makes it better or worse.    Possible symptoms: headache, stomach upset, feeling confused or dizzy, motion sickness, and personality changes.      Returning to activity  Take your time returning to activity. A doctor can help determine what levels of activity are best for  "you. If you re returning to a sport, you should see a healthcare provider before doing so.      If you have questions, call  Concussion hotline: [phone] or Athletic medicine hotline: 727.797.8065.          For informational purposes only.  Not to replace the advice of your health care provider.  Copyright   2014 [facility].  All rights reserved.            Sleep Hygiene     What is it?    \"Sleep hygiene\" means having good sleep habits. Follow the tips below to sleep better at night.      Get on a schedule. Go to bed and get up at about the same time every day.    Listen to your body. Only try to sleep when you actually feel tired or sleepy.    Be patient. If you haven't been able to get to sleep after about 20 minutes or more, get up and do something calming or boring until you feel sleepy. Then, return to bed and try again.      Avoid caffeine (coffee, tea, cola drinks, chocolate and some medicines) for at least 4 to 6 hours before going to bed. We also suggest you don't use alcohol or nicotine (cigarettes) during this time. Both can make it harder for you to fall asleep and stay asleep.    Use your bed for sleeping only. That means no TV, computer or homework in bed!    Don't nap during the day. If you do nap, make sure it is for less than an hour and before 3 p.m.    Create sleep rituals that remind your body that it is time to sleep. Examples include breathing exercises, stretching, or reading a book.     Try a bath or shower before bed. Having a hot bath 1 to 2 hours before bedtime can help you feel sleepy.    Don't watch the clock. Checking the clock during the night can wake you up. It can also lead to negative thoughts such as \"I will never fall asleep.\"    Use a sleep diary. Track your sleep schedule to know your sleep patterns and to see where you can improve.    Get regular exercise. But try not to do heavy exercise in the 4 hours before bedtime.      Eat a healthy, balanced diet. Try " eating a light, healthy snack before bed, but avoid eating a heavy meal.    Create the right sleeping area. A cool, dark, quiet room is best. If needed, try earplugs, fans and blackout curtains.      Keep your daytime routine the same even if you have a bad night sleep. Avoiding activities the next day can make it harder to sleep.          For informational purposes only. Not to replace the advice of your health care provider. Copyright   2013 [facility]. All rights reserved.

## 2020-01-08 NOTE — NURSING NOTE
"Chief Complaint   Patient presents with     Fall       Initial /70 (Cuff Size: Adult Regular)   Pulse 72   Temp 97.7  F (36.5  C) (Tympanic)   Resp 16   Ht 1.702 m (5' 7\")   Wt 82.6 kg (182 lb)   BMI 28.51 kg/m   Estimated body mass index is 28.51 kg/m  as calculated from the following:    Height as of this encounter: 1.702 m (5' 7\").    Weight as of this encounter: 82.6 kg (182 lb).    Patient presents to the clinic using No DME    Health Maintenance that is potentially due pending provider review:  Pap Smear    Pt will schedule physical appt.    Katie Wilson, TAMMIE        "

## 2020-03-20 ENCOUNTER — E-VISIT (OUTPATIENT)
Dept: FAMILY MEDICINE | Facility: CLINIC | Age: 25
End: 2020-03-20
Payer: COMMERCIAL

## 2020-03-20 DIAGNOSIS — Z53.9 ERRONEOUS ENCOUNTER--DISREGARD: Primary | ICD-10-CM

## 2020-03-20 PROCEDURE — 99207 ZZC NO BILLABLE SERVICE THIS VISIT: CPT | Performed by: NURSE PRACTITIONER

## 2020-03-23 ENCOUNTER — OFFICE VISIT (OUTPATIENT)
Dept: FAMILY MEDICINE | Facility: CLINIC | Age: 25
End: 2020-03-23
Payer: COMMERCIAL

## 2020-03-23 ENCOUNTER — TELEPHONE (OUTPATIENT)
Dept: FAMILY MEDICINE | Facility: CLINIC | Age: 25
End: 2020-03-23

## 2020-03-23 VITALS
WEIGHT: 179 LBS | BODY MASS INDEX: 28.09 KG/M2 | SYSTOLIC BLOOD PRESSURE: 110 MMHG | TEMPERATURE: 98.4 F | HEART RATE: 88 BPM | DIASTOLIC BLOOD PRESSURE: 64 MMHG | RESPIRATION RATE: 16 BRPM | HEIGHT: 67 IN

## 2020-03-23 DIAGNOSIS — F32.0 MILD MAJOR DEPRESSION (H): ICD-10-CM

## 2020-03-23 DIAGNOSIS — R10.84 ABDOMINAL PAIN, GENERALIZED: Primary | ICD-10-CM

## 2020-03-23 DIAGNOSIS — N91.2 AMENORRHEA: ICD-10-CM

## 2020-03-23 LAB
ALBUMIN SERPL-MCNC: 3.7 G/DL (ref 3.4–5)
ALP SERPL-CCNC: 39 U/L (ref 40–150)
ALT SERPL W P-5'-P-CCNC: 25 U/L (ref 0–50)
AMYLASE SERPL-CCNC: 65 U/L (ref 30–110)
ANION GAP SERPL CALCULATED.3IONS-SCNC: 3 MMOL/L (ref 3–14)
AST SERPL W P-5'-P-CCNC: 14 U/L (ref 0–45)
BASOPHILS # BLD AUTO: 0 10E9/L (ref 0–0.2)
BASOPHILS NFR BLD AUTO: 0.3 %
BILIRUB SERPL-MCNC: 0.4 MG/DL (ref 0.2–1.3)
BUN SERPL-MCNC: 15 MG/DL (ref 7–30)
CALCIUM SERPL-MCNC: 9 MG/DL (ref 8.5–10.1)
CHLORIDE SERPL-SCNC: 110 MMOL/L (ref 94–109)
CO2 SERPL-SCNC: 28 MMOL/L (ref 20–32)
CREAT SERPL-MCNC: 0.66 MG/DL (ref 0.52–1.04)
DIFFERENTIAL METHOD BLD: NORMAL
EOSINOPHIL # BLD AUTO: 0.1 10E9/L (ref 0–0.7)
EOSINOPHIL NFR BLD AUTO: 1.4 %
ERYTHROCYTE [DISTWIDTH] IN BLOOD BY AUTOMATED COUNT: 12.7 % (ref 10–15)
GFR SERPL CREATININE-BSD FRML MDRD: >90 ML/MIN/{1.73_M2}
GLUCOSE SERPL-MCNC: 91 MG/DL (ref 70–99)
HCG UR QL: NEGATIVE
HCT VFR BLD AUTO: 39.4 % (ref 35–47)
HGB BLD-MCNC: 13 G/DL (ref 11.7–15.7)
LIPASE SERPL-CCNC: 145 U/L (ref 73–393)
LYMPHOCYTES # BLD AUTO: 1.6 10E9/L (ref 0.8–5.3)
LYMPHOCYTES NFR BLD AUTO: 27.9 %
MCH RBC QN AUTO: 32.3 PG (ref 26.5–33)
MCHC RBC AUTO-ENTMCNC: 33 G/DL (ref 31.5–36.5)
MCV RBC AUTO: 98 FL (ref 78–100)
MONOCYTES # BLD AUTO: 0.4 10E9/L (ref 0–1.3)
MONOCYTES NFR BLD AUTO: 6.2 %
NEUTROPHILS # BLD AUTO: 3.7 10E9/L (ref 1.6–8.3)
NEUTROPHILS NFR BLD AUTO: 64.2 %
PLATELET # BLD AUTO: 225 10E9/L (ref 150–450)
POTASSIUM SERPL-SCNC: 3.7 MMOL/L (ref 3.4–5.3)
PROT SERPL-MCNC: 7.6 G/DL (ref 6.8–8.8)
RBC # BLD AUTO: 4.02 10E12/L (ref 3.8–5.2)
SODIUM SERPL-SCNC: 141 MMOL/L (ref 133–144)
WBC # BLD AUTO: 5.8 10E9/L (ref 4–11)

## 2020-03-23 PROCEDURE — 36415 COLL VENOUS BLD VENIPUNCTURE: CPT | Performed by: NURSE PRACTITIONER

## 2020-03-23 PROCEDURE — 85025 COMPLETE CBC W/AUTO DIFF WBC: CPT | Performed by: NURSE PRACTITIONER

## 2020-03-23 PROCEDURE — 83690 ASSAY OF LIPASE: CPT | Performed by: NURSE PRACTITIONER

## 2020-03-23 PROCEDURE — 80053 COMPREHEN METABOLIC PANEL: CPT | Performed by: NURSE PRACTITIONER

## 2020-03-23 PROCEDURE — 99214 OFFICE O/P EST MOD 30 MIN: CPT | Performed by: NURSE PRACTITIONER

## 2020-03-23 PROCEDURE — 81025 URINE PREGNANCY TEST: CPT | Performed by: NURSE PRACTITIONER

## 2020-03-23 PROCEDURE — 82150 ASSAY OF AMYLASE: CPT | Performed by: NURSE PRACTITIONER

## 2020-03-23 ASSESSMENT — PATIENT HEALTH QUESTIONNAIRE - PHQ9
SUM OF ALL RESPONSES TO PHQ QUESTIONS 1-9: 10
5. POOR APPETITE OR OVEREATING: SEVERAL DAYS

## 2020-03-23 ASSESSMENT — ANXIETY QUESTIONNAIRES
2. NOT BEING ABLE TO STOP OR CONTROL WORRYING: MORE THAN HALF THE DAYS
7. FEELING AFRAID AS IF SOMETHING AWFUL MIGHT HAPPEN: SEVERAL DAYS
6. BECOMING EASILY ANNOYED OR IRRITABLE: MORE THAN HALF THE DAYS
1. FEELING NERVOUS, ANXIOUS, OR ON EDGE: MORE THAN HALF THE DAYS
5. BEING SO RESTLESS THAT IT IS HARD TO SIT STILL: MORE THAN HALF THE DAYS
3. WORRYING TOO MUCH ABOUT DIFFERENT THINGS: MORE THAN HALF THE DAYS
GAD7 TOTAL SCORE: 12

## 2020-03-23 ASSESSMENT — MIFFLIN-ST. JEOR: SCORE: 1589.57

## 2020-03-23 NOTE — PROGRESS NOTES
Subjective     Leah Marie Schoenecker is a 24 year old female who presents to clinic today for the following health issues:    HPI   Abdominal Pain      Duration: 3 weeks - Worse since Friday 2/20/2020    Description (location/character/radiation): Generalized - squeezing pain        Associated flank pain: None    Intensity:  moderate    Accompanying signs and symptoms:        Fever/Chills: YES- chills 2/20/2020       Gas/Bloating: YES       Nausea/vomitting: YES- nausea        Diarrhea: YES- stopped Friday and has not had BM since        Dysuria or Hematuria: no     History (previous similar pain/trauma/previous testing): Colonoscopy in past     Precipitating or alleviating factors:       Pain worse with eating/BM/urination: worse with eating        Pain relieved by BM: no     Therapies tried and outcome: dulcolax (made cramping worse)  , stool softener     LMP:  Feb 9th  - took pregnancy test at home and negative. 18 days late but going through a lot of stress     Significant lower abdominal pain improved with improvement of loose stools on Friday, mild symptoms still present  Epigastric, right upper quadrant and left upper quadrant continue  Urine cleared up with hydration, no symptoms.    Patient Active Problem List   Diagnosis     Mild major depression (H)     Anxiety     Past Surgical History:   Procedure Laterality Date     COLONOSCOPY N/A 6/10/2015    Procedure: COLONOSCOPY;  Surgeon: Jose Leroy MD;  Location: [facility]     NO HISTORY OF SURGERY         Social History     Tobacco Use     Smoking status: Never Smoker     Smokeless tobacco: Never Used   Substance Use Topics     Alcohol use: Yes     Comment: monthly at most     Family History   Problem Relation Age of Onset     Hypertension Maternal Grandmother      Diabetes Paternal Grandfather      Heart Disease Paternal Grandfather      Bladder Cancer Paternal Grandfather      Other - See Comments Sister         endometrises      Hypertension Mother  "     Other - See Comments Sister         endometrioses      Thyroid Disease Maternal Aunt          Current Outpatient Medications   Medication Sig Dispense Refill     cyclobenzaprine (FLEXERIL) 5 MG tablet Take 1-2 tablets (5-10 mg) by mouth 3 times daily as needed for muscle spasms 30 tablet 0     ibuprofen (ADVIL/MOTRIN) 200 MG capsule Take 200 mg by mouth every 4 hours as needed for fever       levonorgestrel-ethinyl estradiol (FALMINA) 0.1-20 MG-MCG tablet Take 1 tablet by mouth daily 84 tablet 4     albuterol (PROAIR HFA/PROVENTIL HFA/VENTOLIN HFA) 108 (90 Base) MCG/ACT inhaler Inhale 2 puffs into the lungs every 6 hours as needed for shortness of breath / dyspnea or wheezing (Patient not taking: Reported on 3/23/2020) 1 Inhaler 0     No Known Allergies    Reviewed and updated as needed this visit by Provider  Tobacco  Allergies  Meds  Problems  Med Hx  Surg Hx  Fam Hx         Review of Systems   ROS COMP: Constitutional, HEENT, cardiovascular, pulmonary, gi and gu systems are negative, except as otherwise noted.      Objective    /64 (Cuff Size: Adult Regular)   Pulse 88   Temp 98.4  F (36.9  C) (Tympanic)   Resp 16   Ht 1.702 m (5' 7\")   Wt 81.2 kg (179 lb)   LMP 02/09/2020   BMI 28.04 kg/m    Body mass index is 28.04 kg/m .  Physical Exam   GENERAL: healthy, alert and no distress  NECK: no adenopathy, no asymmetry, masses, or scars and thyroid normal to palpation  RESP: lungs clear to auscultation - no rales, rhonchi or wheezes  CV: regular rate and rhythm, normal S1 S2, no S3 or S4, no murmur, click or rub  ABDOMEN: tenderness epigastric, RUQ, LUQ and LLQ and bowel sounds normal  NEURO: Normal strength and tone, mentation intact and speech normal  PSYCH: mentation appears normal, affect normal/bright    Diagnostic Test Results:  Results for orders placed or performed in visit on 03/23/20   CBC with platelets and differential     Status: None   Result Value Ref Range    WBC 5.8 4.0 - 11.0 " 10e9/L    RBC Count 4.02 3.8 - 5.2 10e12/L    Hemoglobin 13.0 11.7 - 15.7 g/dL    Hematocrit 39.4 35.0 - 47.0 %    MCV 98 78 - 100 fl    MCH 32.3 26.5 - 33.0 pg    MCHC 33.0 31.5 - 36.5 g/dL    RDW 12.7 10.0 - 15.0 %    Platelet Count 225 150 - 450 10e9/L    % Neutrophils 64.2 %    % Lymphocytes 27.9 %    % Monocytes 6.2 %    % Eosinophils 1.4 %    % Basophils 0.3 %    Absolute Neutrophil 3.7 1.6 - 8.3 10e9/L    Absolute Lymphocytes 1.6 0.8 - 5.3 10e9/L    Absolute Monocytes 0.4 0.0 - 1.3 10e9/L    Absolute Eosinophils 0.1 0.0 - 0.7 10e9/L    Absolute Basophils 0.0 0.0 - 0.2 10e9/L    Diff Method Automated Method    HCG Qual, Urine ([id number])     Status: None   Result Value Ref Range    HCG Qual Urine Negative NEG^Negative   Comprehensive metabolic panel (BMP + Alb, Alk Phos, ALT, AST, Total. Bili, TP)     Status: Abnormal   Result Value Ref Range    Sodium 141 133 - 144 mmol/L    Potassium 3.7 3.4 - 5.3 mmol/L    Chloride 110 (H) 94 - 109 mmol/L    Carbon Dioxide 28 20 - 32 mmol/L    Anion Gap 3 3 - 14 mmol/L    Glucose 91 70 - 99 mg/dL    Urea Nitrogen 15 7 - 30 mg/dL    Creatinine 0.66 0.52 - 1.04 mg/dL    GFR Estimate >90 >60 mL/min/[1.73_m2]    GFR Estimate If Black >90 >60 mL/min/[1.73_m2]    Calcium 9.0 8.5 - 10.1 mg/dL    Bilirubin Total 0.4 0.2 - 1.3 mg/dL    Albumin 3.7 3.4 - 5.0 g/dL    Protein Total 7.6 6.8 - 8.8 g/dL    Alkaline Phosphatase 39 (L) 40 - 150 U/L    ALT 25 0 - 50 U/L    AST 14 0 - 45 U/L   Amylase     Status: None   Result Value Ref Range    Amylase 65 30 - 110 U/L   Lipase     Status: None   Result Value Ref Range    Lipase 145 73 - 393 U/L           Assessment & Plan     1. Abdominal pain, generalized  No acute distress.  Labs unremarkable.  With resolution of loose stools recommend monitoring at this time.  Treat epigastric symptoms with OTC omeprazole or ranitidine.  Symptomatic care and follow up discussed.  - CBC with platelets and differential  - HCG Qual, Urine ([id number])  -  Comprehensive metabolic panel (BMP + Alb, Alk Phos, ALT, AST, Total. Bili, TP)  - Amylase  - Lipase    2. Amenorrhea  HCG negative.   - HCG Qual, Urine ([id number])    3. Mild major depression (H)  PHQ-9 completed today-worsened with recent death of grandfather.     Home care instructions were reviewed with the patient. The risks, benefits and treatment options of prescribed medications or other treatments have been discussed with the patient. The patient verbalized their understanding and should call or follow up if no improvement or if they develop further problems.    Return in about 1 week (around 3/30/2020), or if symptoms worsen or fail to improve.    LUISA Hernandez [facility]

## 2020-03-23 NOTE — TELEPHONE ENCOUNTER
Provider E-Visit time total (minutes): NA    With fever and abdominal pain recommend that Angie be assessed in clinic.  Appointment made.    LUISA Hernandez CNP

## 2020-03-24 ASSESSMENT — ANXIETY QUESTIONNAIRES: GAD7 TOTAL SCORE: 12

## 2020-03-30 ENCOUNTER — MYC MEDICAL ADVICE (OUTPATIENT)
Dept: FAMILY MEDICINE | Facility: CLINIC | Age: 25
End: 2020-03-30

## 2020-03-30 DIAGNOSIS — R19.5 DARK STOOLS: ICD-10-CM

## 2020-03-30 DIAGNOSIS — R10.13 EPIGASTRIC PAIN: Primary | ICD-10-CM

## 2020-03-31 ENCOUNTER — TELEPHONE (OUTPATIENT)
Dept: SURGERY | Facility: CLINIC | Age: 25
End: 2020-03-31

## 2020-03-31 NOTE — TELEPHONE ENCOUNTER
I left a message for Angie to return our call regarding a referral. An EGD was ordered by Jaimee Kaplan NP for Epigastric pain, and dark stool. Dr Irvin has reviewed Angie's record. He would like her to delay the EGD for 30 days.  The  will be notified once we have spoken to Angie. Lida SCHMIDT Rn

## 2020-04-03 NOTE — TELEPHONE ENCOUNTER
"Spoke with Angie, black stools. Abdominal cramping and loose stools. Periods just came yesterday, heavy. \"tightness\" with eating in epigastric area.  Colonoscopy in 2015 for similar symptoms of diarrhea, cramping and constipation, but worse then. Will do a FIT test to assess for blood in stool, plan pending results.  High fiber diet recommended.    LUISA Hernandez CNP    "

## 2020-04-07 ENCOUNTER — MYC MEDICAL ADVICE (OUTPATIENT)
Dept: SURGERY | Facility: CLINIC | Age: 25
End: 2020-04-07

## 2020-04-10 DIAGNOSIS — R19.5 DARK STOOLS: ICD-10-CM

## 2020-04-10 PROCEDURE — 82274 ASSAY TEST FOR BLOOD FECAL: CPT | Performed by: NURSE PRACTITIONER

## 2020-04-17 LAB — HEMOCCULT STL QL IA: NEGATIVE

## 2020-05-19 ENCOUNTER — MYC MEDICAL ADVICE (OUTPATIENT)
Dept: FAMILY MEDICINE | Facility: CLINIC | Age: 25
End: 2020-05-19

## 2020-05-19 NOTE — TELEPHONE ENCOUNTER
Srikanth Adams, please call to set up a virtual visit with one of our providers, [phone]. Thanks, Caitlin Mosqueda RN

## 2020-05-19 NOTE — TELEPHONE ENCOUNTER
Did schedule pt Virtual Visit 5/20   Pt does have migraine. Please Advise  Stephanie Orn [place] Sec

## 2020-05-20 ENCOUNTER — VIRTUAL VISIT (OUTPATIENT)
Dept: FAMILY MEDICINE | Facility: CLINIC | Age: 25
End: 2020-05-20
Payer: COMMERCIAL

## 2020-05-20 DIAGNOSIS — B34.9 NONSPECIFIC SYNDROME SUGGESTIVE OF VIRAL ILLNESS: Primary | ICD-10-CM

## 2020-05-20 DIAGNOSIS — F41.9 ANXIETY: ICD-10-CM

## 2020-05-20 PROCEDURE — 99214 OFFICE O/P EST MOD 30 MIN: CPT | Mod: 95 | Performed by: PHYSICIAN ASSISTANT

## 2020-05-20 RX ORDER — ALBUTEROL SULFATE 90 UG/1
2 AEROSOL, METERED RESPIRATORY (INHALATION) EVERY 6 HOURS PRN
Qty: 1 INHALER | Refills: 0 | Status: CANCELLED | OUTPATIENT
Start: 2020-05-20

## 2020-05-20 NOTE — LETTER
[facility] [address] [facility]  [address] [place] [id number]  Phone: [phone]  Fax: [phone]    05/20/20    Leah Marie Schoenecker  [address] [facility] [address]      To whom it may concern:     Angie was evaluated today.  She will be off work pending results of covid test and improvement in symptoms.  She may be off work for the next 14 days.    Sincerely,      Allyson Egan PA-C

## 2020-05-20 NOTE — PROGRESS NOTES
"Leah Marie Schoenecker is a 25 year old female who is being evaluated via a billable video visit.      The patient has been notified of following:     \"This video visit will be conducted via a call between you and your physician/provider. We have found that certain health care needs can be provided without the need for an in-person physical exam.  This service lets us provide the care you need with a video conversation.  If a prescription is necessary we can send it directly to your pharmacy.  If lab work is needed we can place an order for that and you can then stop by our lab to have the test done at a later time.    Video visits are billed at different rates depending on your insurance coverage.  Please reach out to your insurance provider with any questions.    If during the course of the call the physician/provider feels a video visit is not appropriate, you will not be charged for this service.\"    Patient has given verbal consent for Video visit? Yes    How would you like to obtain your AVS? [facility]    Patient would like the video invitation sent by: Text to cell phone: [phone]    Will anyone else be joining your video visit? No    Subjective     Leah Marie Schoenecker is a 25 year old female who presents today via video visit for the following health issues:    HPI  ENT Symptoms             Symptoms: cc Present Absent Comment   Fever/Chills  x     Fatigue  x     Muscle Aches  x     Eye Irritation   x    Sneezing   x    Nasal Jose Antonio/Drg   x    Sinus Pressure/Pain   x    Loss of smell   x    Dental pain   x    Sore Throat   x    Swollen Glands  x  neck   Ear Pain/Fullness  x  Feels clogged   Cough   x    Wheeze   x    Chest Pain  x  With deep breathes feels tight     Shortness of breath   x    Rash   x    Other  x  Diarrhea for 5 days resolved now returned     Symptom duration:  4 days for fever, chills, and body aches   Symptom severity:  Not resolving    Treatments tried:  IBU does make it tolerable  " "  Contacts:  None known     Very tired until Sunday.  Sunday severe HA, whole head hurt very bad, slept most of Sun and Mon..  HA continues, 7/10, dark helpful, no other light/sound sensitivity or nausea.  Yesterday fever 99.4, and body aches.  Dull pain in back of throat.  Off on chills, hot, sweating.  Has some stuffy nose, but has been going for awhile and feels that is her usual allergies.  No loss taste/smell.  Belly sharp shooting pains above belly button, improves a lot after BM to just mild dull pain.  Diarrhea still yesterday but none today.  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  No known covid exposure.    Has gotten panic attacks about the risks of covid.  But cannot stop working or loses her health insurance.  Is seeing a counselor.  Has tried 4 anxiety medications in past.    Needs work note sent to her email.      Video Start Time: 8:33    BP Readings from Last 3 Encounters:   03/23/20 110/64   01/08/20 120/70   02/26/19 110/70    Wt Readings from Last 3 Encounters:   03/23/20 81.2 kg (179 lb)   01/08/20 82.6 kg (182 lb)   02/26/19 92.1 kg (203 lb)                    Reviewed and updated as needed this visit by Provider         Review of Systems   Constitutional, HEENT, cardiovascular, pulmonary, GI, , musculoskeletal, neuro, skin, endocrine and psych systems are negative, except as otherwise noted.      Objective    There were no vitals taken for this visit.  Estimated body mass index is 28.04 kg/m  as calculated from the following:    Height as of 3/23/20: 1.702 m (5' 7\").    Weight as of 3/23/20: 81.2 kg (179 lb).  Physical Exam     GENERAL: Healthy, alert and no distress  EYES: Eyes grossly normal to inspection.  No discharge or erythema, or obvious scleral/conjunctival abnormalities.  RESP: No audible wheeze, cough, or visible cyanosis.  No visible retractions or increased work of breathing.    SKIN: Visible skin clear. " No significant rash, abnormal pigmentation or lesions.  NEURO: Cranial nerves grossly intact.  Mentation and speech appropriate for age.  PSYCH: Mentation appears normal, affect normal/bright, judgement and insight intact, normal speech and appearance well-groomed.      Diagnostic Test Results:  Labs reviewed in Epic        Assessment & Plan       ICD-10-CM    1. Nonspecific syndrome suggestive of viral illness  B34.9 Symptomatic COVID-19 Virus (Coronavirus) by PCR   2. Anxiety  F41.9 Symptomatic COVID-19 Virus (Coronavirus) by PCR   mild ST.  Doubt strep.    Patient Instructions   You will be called to set up covid test    Can add tylenol or excedrin to ibuprofen for headache - follow OTC directions  If becomes very severe headache, re-evaluate    Call if feeling better and wishing consideration for sooner return to work    Options for anxiety if needed.      Instructions for Patients  Your symptoms show that you may have coronavirus (COVID-19). This illness can cause fever, cough and trouble breathing. Many people get a mild case and get better on their own. Some people can get very sick.     Not all patients are tested for COVID-19. If you need to be tested, your care team will let you know.    How can I protect others?    Without a test, we can t know for sure that you have COVID-19. For safety, it s very important to follow these rules.    Stay home and away from others (self-isolate) until:    You ve had no fever--and no medicine that reduces fever--for 3 full days (72 hours). And      Your other symptoms have resolved (gotten better). For example, your cough or breathing has improved. And     At least 10 days have passed since your symptoms started.    During this time:    Stay in your own room (and use your own bathroom), if you can.    Stay away from others in your home. No hugging, kissing or shaking hands.    No visitors.    Don t go to work, school or anywhere else.     Clean  high touch  surfaces often  (doorknobs, counters, handles, etc.). Use a household cleaning spray or wipes.    Cover your mouth and nose with a mask, tissue or washcloth to avoid spreading germs.    Wash your hands and face often. Use soap and water.    For more tips, go to https://www.cdc.gov/coronavirus/2019-ncov/downloads/10Things.pdf.    How can I take care of myself?    1. Get lots of rest. Drink extra fluids (unless a doctor has told you not to).     2. Take Tylenol (acetaminophen) for fever or pain. If you have liver or kidney problems, ask your family doctor if it's okay to take Tylenol.     Adults can take either:     650 mg (two 325 mg pills) every 4 to 6 hours, or     1,000 mg (two 500 mg pills) every 8 hours as needed.     Note: Don't take more than 3,000 mg in one day.   Acetaminophen is found in many medicines (both prescribed and over-the-counter medicines). Read all labels to be sure you don't take too much.   For children, check the Tylenol bottle for the right dose. The dose is based on  the child's age or weight.    3. If you have other health problems (like cancer, heart failure, an organ transplant or severe kidney disease): Call your specialty clinic if you don't feel better in the next 2 days.    4. Know when to call 911: If your breathing is so bad that it keeps you from doing normal activities, call 911 or go to the emergency room. Tell them that you've been staying home and may have COVID-19.    To schedule an appointment for curbside testing:    Provider/Staff to call and schedule the patient for the appointment per the System Ambulatory Workflow recommendations    Be sure to obtain details about the patients  care for the      Your symptoms show that you may have coronavirus (COVID-19). This illness can cause fever, cough and trouble breathing.     We would like to test you for this virus. This will be a curbside test--you will drive to the clinic, and we will test you in your car.    Please follow these  steps:    1. We will call to schedule your test. Be ready to share details about the car you ll be in.   2. A member of our care team will ask you some questions. Then, they will use a swab to collect samples from your nose and throat.     We will test your samples for COVID-19, the flu and maybe other illnesses as well. We will call to share your test results.    How can I protect others?    Stay home and away from others (self-isolate) until:    You ve had no fever--and no medicine that reduces fever--for 3 full days (72 hours). And      Your other symptoms have resolved (gotten better). For example, your cough or breathing has improved. And     At least 10 days have passed since your symptoms started.    Stay at least 6 feet away from others. (If someone will drive you to your test, stay in the backseat, as far away from the  as you can.)     Don t go to work, school or anywhere else. When it s time for your test, go straight to the testing site. Don t make any stops on the way there or back.     Wash your hands and face often. Use soap and water.     Cover your mouth and nose with a mask, tissue or washcloth.     Don t touch anyone. No hugging, kissing or handshakes.    How can I take care of myself?    5. Get lots of rest. Drink extra fluids (unless a doctor has told you not to).     6. Take Tylenol (acetaminophen) for fever or pain. If you have liver or kidney problems, ask your family doctor if it's okay to take Tylenol.     Adults can take either:     650 mg (two 325 mg pills) every 4 to 6 hours, or     1,000 mg (two 500 mg pills) every 8 hours as needed.     Note: Don't take more than 3,000 mg in one day.   Acetaminophen is found in many medicines (both prescribed and over-the-counter medicines). Read all labels to be sure you don't take too much.   For children, check the Tylenol bottle for the right dose. The dose is based on  the child's age or weight.    7. If you have other health problems (like  cancer, heart failure, an organ transplant or severe kidney disease): Call your specialty clinic if you don't feel better in the next 2 days.    8. Know when to call 911: If your breathing is so bad that it keeps you from doing normal activities, call 911 or go to the emergency room. Tell them that you've been staying home and may have COVID-19.      Thank you for taking steps to prevent the spread of this virus.  o Limit your contact with others.  o Wear a simple mask to cover your cough.  o Wash your hands well and often.  o If you need medical care, go to [email or web address] or contact your health care provider.     For more about COVID-19 and caring for yourself at home, visit the CDC website at https://www.cdc.gov/coronavirus/2019-ncov/about/steps-when-sick.html.     To learn about care at [facility], please go to [email or web address].     Below are the COVID-19 hotlines at the [facility] ([facility]). Interpreters are available.     For health questions: Call 099-999-5565 or 1-255.759.3359 (7 a.m. to 7 p.m.)    For questions about schools and childcare: Call 978-478-1736 or 1-733.336.1255 (7 a.m. to 7 p.m.)        No follow-ups on file.    Allyson Egan PA-C  [facility]      Video-Visit Details    Type of service:  Video Visit    Video End Time:8:56 AM    Originating Location (pt. Location): Home    Distant Location (provider location):  [facility]     Platform used for Video Visit: Doximity    No follow-ups on file.       Allyson Egan PA-C

## 2020-05-20 NOTE — PATIENT INSTRUCTIONS
You will be called to set up covid test    Can add tylenol or excedrin to ibuprofen for headache - follow OTC directions  If becomes very severe headache, re-evaluate    Call if feeling better and wishing consideration for sooner return to work    Options for anxiety if needed.      Instructions for Patients  Your symptoms show that you may have coronavirus (COVID-19). This illness can cause fever, cough and trouble breathing. Many people get a mild case and get better on their own. Some people can get very sick.     Not all patients are tested for COVID-19. If you need to be tested, your care team will let you know.    How can I protect others?    Without a test, we can t know for sure that you have COVID-19. For safety, it s very important to follow these rules.    Stay home and away from others (self-isolate) until:    You ve had no fever--and no medicine that reduces fever--for 3 full days (72 hours). And      Your other symptoms have resolved (gotten better). For example, your cough or breathing has improved. And     At least 10 days have passed since your symptoms started.    During this time:    Stay in your own room (and use your own bathroom), if you can.    Stay away from others in your home. No hugging, kissing or shaking hands.    No visitors.    Don t go to work, school or anywhere else.     Clean  high touch  surfaces often (doorknobs, counters, handles, etc.). Use a household cleaning spray or wipes.    Cover your mouth and nose with a mask, tissue or washcloth to avoid spreading germs.    Wash your hands and face often. Use soap and water.    For more tips, go to https://www.cdc.gov/coronavirus/2019-ncov/downloads/10Things.pdf.    How can I take care of myself?    1. Get lots of rest. Drink extra fluids (unless a doctor has told you not to).     2. Take Tylenol (acetaminophen) for fever or pain. If you have liver or kidney problems, ask your family doctor if it's okay to take Tylenol.     Adults can  take either:     650 mg (two 325 mg pills) every 4 to 6 hours, or     1,000 mg (two 500 mg pills) every 8 hours as needed.     Note: Don't take more than 3,000 mg in one day.   Acetaminophen is found in many medicines (both prescribed and over-the-counter medicines). Read all labels to be sure you don't take too much.   For children, check the Tylenol bottle for the right dose. The dose is based on  the child's age or weight.    3. If you have other health problems (like cancer, heart failure, an organ transplant or severe kidney disease): Call your specialty clinic if you don't feel better in the next 2 days.    4. Know when to call 911: If your breathing is so bad that it keeps you from doing normal activities, call 911 or go to the emergency room. Tell them that you've been staying home and may have COVID-19.    To schedule an appointment for curbside testing:    Provider/Staff to call and schedule the patient for the appointment per the System Ambulatory Workflow recommendations    Be sure to obtain details about the patients  care for the      Your symptoms show that you may have coronavirus (COVID-19). This illness can cause fever, cough and trouble breathing.     We would like to test you for this virus. This will be a curbside test--you will drive to the clinic, and we will test you in your car.    Please follow these steps:    1. We will call to schedule your test. Be ready to share details about the car you ll be in.   2. A member of our care team will ask you some questions. Then, they will use a swab to collect samples from your nose and throat.     We will test your samples for COVID-19, the flu and maybe other illnesses as well. We will call to share your test results.    How can I protect others?    Stay home and away from others (self-isolate) until:    You ve had no fever--and no medicine that reduces fever--for 3 full days (72 hours). And      Your other symptoms have resolved (gotten  better). For example, your cough or breathing has improved. And     At least 10 days have passed since your symptoms started.    Stay at least 6 feet away from others. (If someone will drive you to your test, stay in the backseat, as far away from the  as you can.)     Don t go to work, school or anywhere else. When it s time for your test, go straight to the testing site. Don t make any stops on the way there or back.     Wash your hands and face often. Use soap and water.     Cover your mouth and nose with a mask, tissue or washcloth.     Don t touch anyone. No hugging, kissing or handshakes.    How can I take care of myself?    5. Get lots of rest. Drink extra fluids (unless a doctor has told you not to).     6. Take Tylenol (acetaminophen) for fever or pain. If you have liver or kidney problems, ask your family doctor if it's okay to take Tylenol.     Adults can take either:     650 mg (two 325 mg pills) every 4 to 6 hours, or     1,000 mg (two 500 mg pills) every 8 hours as needed.     Note: Don't take more than 3,000 mg in one day.   Acetaminophen is found in many medicines (both prescribed and over-the-counter medicines). Read all labels to be sure you don't take too much.   For children, check the Tylenol bottle for the right dose. The dose is based on  the child's age or weight.    7. If you have other health problems (like cancer, heart failure, an organ transplant or severe kidney disease): Call your specialty clinic if you don't feel better in the next 2 days.    8. Know when to call 911: If your breathing is so bad that it keeps you from doing normal activities, call 911 or go to the emergency room. Tell them that you've been staying home and may have COVID-19.      Thank you for taking steps to prevent the spread of this virus.  o Limit your contact with others.  o Wear a simple mask to cover your cough.  o Wash your hands well and often.  o If you need medical care, go to [email or web address] or contact  your health care provider.     For more about COVID-19 and caring for yourself at home, visit the CDC website at https://www.cdc.gov/coronavirus/2019-ncov/about/steps-when-sick.html.     To learn about care at [facility], please go to [email or web address].     Below are the COVID-19 hotlines at the [facility] ([facility]). Interpreters are available.     For health questions: Call 825-748-7434 or 1-298.409.6070 (7 a.m. to 7 p.m.)    For questions about schools and childcare: Call 463-545-0545 or 1-471.812.6681 (7 a.m. to 7 p.m.)

## 2020-05-21 ENCOUNTER — OFFICE VISIT - HEALTHEAST (OUTPATIENT)
Dept: FAMILY MEDICINE | Facility: CLINIC | Age: 25
End: 2020-05-21

## 2020-05-21 ENCOUNTER — AMBULATORY - HEALTHEAST (OUTPATIENT)
Dept: FAMILY MEDICINE | Facility: CLINIC | Age: 25
End: 2020-05-21

## 2020-05-21 DIAGNOSIS — B34.9 NONSPECIFIC SYNDROME SUGGESTIVE OF VIRAL ILLNESS: ICD-10-CM

## 2020-05-21 DIAGNOSIS — F41.9 ANXIETY: ICD-10-CM

## 2020-05-22 ENCOUNTER — TELEPHONE (OUTPATIENT)
Dept: FAMILY MEDICINE | Facility: CLINIC | Age: 25
End: 2020-05-22

## 2020-05-22 ENCOUNTER — COMMUNICATION - HEALTHEAST (OUTPATIENT)
Dept: FAMILY MEDICINE | Facility: CLINIC | Age: 25
End: 2020-05-22

## 2020-05-22 NOTE — TELEPHONE ENCOUNTER
Reason for Call:  Procedure  has reached out to schedule diagnostic upper gi endoscopy     Detailed comments: patient states that she was tested for COVID as she was symptomatic     She states that she will call to schedule when she is able pending results and/or non symptomatic    No further action necessary for procedure  at this time    Phone Number Patient can be reached at: Home number on file [phone] (home)    Best Time: NA    Can we leave a detailed message on this number? Not Applicable    Call taken on 5/22/2020 at 9:49 AM by Adela Cash

## 2020-05-27 ENCOUNTER — MYC MEDICAL ADVICE (OUTPATIENT)
Dept: FAMILY MEDICINE | Facility: CLINIC | Age: 25
End: 2020-05-27

## 2020-05-29 ENCOUNTER — TELEPHONE (OUTPATIENT)
Dept: FAMILY MEDICINE | Facility: CLINIC | Age: 25
End: 2020-05-29

## 2020-05-29 ENCOUNTER — VIRTUAL VISIT (OUTPATIENT)
Dept: FAMILY MEDICINE | Facility: CLINIC | Age: 25
End: 2020-05-29
Payer: COMMERCIAL

## 2020-05-29 DIAGNOSIS — J01.10 ACUTE FRONTAL SINUSITIS, RECURRENCE NOT SPECIFIED: Primary | ICD-10-CM

## 2020-05-29 PROCEDURE — 99213 OFFICE O/P EST LOW 20 MIN: CPT | Mod: 95 | Performed by: NURSE PRACTITIONER

## 2020-05-29 RX ORDER — FLUTICASONE PROPIONATE 50 MCG
2 SPRAY, SUSPENSION (ML) NASAL DAILY
Qty: 16 G | Refills: 1 | Status: SHIPPED | OUTPATIENT
Start: 2020-05-29 | End: 2022-01-10

## 2020-05-29 RX ORDER — ALBUTEROL SULFATE 90 UG/1
2 AEROSOL, METERED RESPIRATORY (INHALATION) EVERY 6 HOURS PRN
Qty: 1 INHALER | Refills: 0 | Status: SHIPPED | OUTPATIENT
Start: 2020-05-29 | End: 2022-12-06

## 2020-05-29 NOTE — PROGRESS NOTES
"Leah Marie Schoenecker is a 25 year old female who is being evaluated via a billable video visit.      The patient has been notified of following:     \"This video visit will be conducted via a call between you and your physician/provider. We have found that certain health care needs can be provided without the need for an in-person physical exam.  This service lets us provide the care you need with a video conversation.  If a prescription is necessary we can send it directly to your pharmacy.  If lab work is needed we can place an order for that and you can then stop by our lab to have the test done at a later time.    Video visits are billed at different rates depending on your insurance coverage.  Please reach out to your insurance provider with any questions.    If during the course of the call the physician/provider feels a video visit is not appropriate, you will not be charged for this service.\"    Patient has given verbal consent for Video visit? Yes    How would you like to obtain your AVS? [facility]    Patient would like the video invitation sent by: Text to cell phone: [phone]    Will anyone else be joining your video visit? No      Subjective     Leah Marie Schoenecker is a 25 year old female who presents today via video visit for the following health issues:    HPI  Chest Pain/ Lungs  Angie did have COVID 19 Testing on 5/21 and that was negative      Onset: 5/17       Description (location/character/radiation/duration): Lungs feel like they are being squeezed when inhaling and when exhailing it feels like pressure and needles and throat hurts too.    Intensity:  mild, moderate    Accompanying signs and symptoms:        Shortness of breath: YES       Sweating: YES- When sleeping wakes up drenched, and doing dishes       Nausea/vomitting: no        Palpitations: no        Other (fevers/chills/cough/heartburn/lightheadedness): YES- A little coughing had fevers from 5/17-05/27, and has some headaches. " Fevers in the 99's    History (similar episodes/previous evaluation): None    Precipitating or alleviating factors:       Worse with exertion: YES       Worse with breathing: YES- noticable       Related to eating: no        Better with burping: no     Therapies tried and outcome: Hot bath or shower, Ibuprofen      Video Start Time: 1458      Patient Active Problem List   Diagnosis     Mild major depression (H)     Anxiety     Past Surgical History:   Procedure Laterality Date     COLONOSCOPY N/A 6/10/2015    Procedure: COLONOSCOPY;  Surgeon: Jose Leroy MD;  Location: [facility]     NO HISTORY OF SURGERY         Social History     Tobacco Use     Smoking status: Never Smoker     Smokeless tobacco: Never Used   Substance Use Topics     Alcohol use: Yes     Comment: monthly at most     Family History   Problem Relation Age of Onset     Hypertension Maternal Grandmother      Diabetes Paternal Grandfather      Heart Disease Paternal Grandfather      Bladder Cancer Paternal Grandfather      Other - See Comments Sister         endometrises      Hypertension Mother      Other - See Comments Sister         endometrioses      Thyroid Disease Maternal Aunt          Current Outpatient Medications   Medication Sig Dispense Refill     albuterol (PROAIR HFA/PROVENTIL HFA/VENTOLIN HFA) 108 (90 Base) MCG/ACT inhaler Inhale 2 puffs into the lungs every 6 hours as needed for shortness of breath / dyspnea or wheezing 1 Inhaler 0     cyclobenzaprine (FLEXERIL) 5 MG tablet Take 1-2 tablets (5-10 mg) by mouth 3 times daily as needed for muscle spasms 30 tablet 0     fluticasone (FLONASE) 50 MCG/ACT nasal spray Spray 2 sprays into both nostrils daily 16 g 1     ibuprofen (ADVIL/MOTRIN) 200 MG capsule Take 200 mg by mouth every 4 hours as needed for fever       LESSINA-28 0.1-20 MG-MCG tablet TAKE 1 TABLET BY MOUTH DAILY 84 tablet 1     No Known Allergies    Reviewed and updated as needed this visit by Provider  Tobacco  Allergies  " Meds  Problems  Med Hx  Surg Hx  Fam Hx         Review of Systems   Constitutional, HEENT, cardiovascular, pulmonary, gi and gu systems are negative, except as otherwise noted.      Objective    LMP 05/27/2020 (Exact Date)   Breastfeeding No   Estimated body mass index is 28.04 kg/m  as calculated from the following:    Height as of 3/23/20: 1.702 m (5' 7\").    Weight as of 3/23/20: 81.2 kg (179 lb).  Physical Exam     GENERAL: Healthy, alert and no distress  EYES: Eyes grossly normal to inspection.  No discharge or erythema, or obvious scleral/conjunctival abnormalities.  RESP: No audible wheeze, cough, or visible cyanosis.  No visible retractions or increased work of breathing.    SKIN: Visible skin clear. No significant rash, abnormal pigmentation or lesions.  NEURO: Cranial nerves grossly intact.  Mentation and speech appropriate for age.  PSYCH: Mentation appears normal, affect normal/bright, judgement and insight intact, normal speech and appearance well-groomed.      Diagnostic Test Results:  Labs reviewed in Epic  none         Assessment & Plan     1. Acute frontal sinusitis, recurrence not specified  Acute, likely viral versus allergic. Non-toxic appearing, appears healthy. COVID test negative. Does have a remote history of asthma, not on any medications. Okay to use albuterol as needed. Discussed starting nasal steroid spray and over the counter anti-histamine along with supportive cares. Advised to follow up in 1 week if symptoms not improved or worsening.   - albuterol (PROAIR HFA/PROVENTIL HFA/VENTOLIN HFA) 108 (90 Base) MCG/ACT inhaler; Inhale 2 puffs into the lungs every 6 hours as needed for shortness of breath / dyspnea or wheezing  Dispense: 1 Inhaler; Refill: 0  - fluticasone (FLONASE) 50 MCG/ACT nasal spray; Spray 2 sprays into both nostrils daily  Dispense: 16 g; Refill: 1     BMI:   Estimated body mass index is 28.04 kg/m  as calculated from the following:    Height as of 3/23/20: 1.702 " "jamie (5' 7\").    Weight as of 3/23/20: 81.2 kg (179 lb).       Patient Instructions   This is most likely a viral versus allergic sinus  Make sure you are getting a lot of rest and fluids  Ibuprofen and/or tylenol for discomfort or fever  Warm salt water gargles 3-4 times a day for sore throat   Stop Benadryl and Start an over the counter Claritin or Zyrtec daily  Start Flonase nasal spray daily (take for minimum of 1 week and at least 1 week after feeling better) when administering POINT OUT towards eyes  Cool mist vaporizer at night   If you can tolerate you can use a nasal saline flush such as the [place] Pot  Sleep with head of bed up at night to promote drainage     If symptoms not improving or worsening check in late next week             Return in about 1 week (around 6/5/2020), or if symptoms worsen or fail to improve.    LUISA Lockwood CNP  [facility]      Video-Visit Details    Type of service:  Video Visit    Video End Time:1514    Originating Location (pt. Location): Home    Distant Location (provider location):  [facility]     Platform used for Video Visit: [facility]    Return in about 1 week (around 6/5/2020), or if symptoms worsen or fail to improve.       LUISA Lockwood CNP       "

## 2020-05-29 NOTE — PATIENT INSTRUCTIONS
This is most likely a viral versus allergic sinus  Make sure you are getting a lot of rest and fluids  Ibuprofen and/or tylenol for discomfort or fever  Warm salt water gargles 3-4 times a day for sore throat   Stop Benadryl and Start an over the counter Claritin or Zyrtec daily  Start Flonase nasal spray daily (take for minimum of 1 week and at least 1 week after feeling better) when administering POINT OUT towards eyes  Cool mist vaporizer at night   If you can tolerate you can use a nasal saline flush such as the Renee Pot  Sleep with head of bed up at night to promote drainage     If symptoms not improving or worsening check in late next week

## 2020-05-29 NOTE — LETTER
[facility]  [address] [facility] [address]  [phone]          May 29, 2020    Leah Marie Schoenecker                                                                                                                     [address] [facility] [address]        To Whom it May Concern,    Angie was evaluated during a virtual visit today 5/29/2020 for continued upper respiratory symptoms. She has been evaluated and tested for COVID which tests were negative on 5/22/2020. Although, her symptoms are not related to COVID I request that she doesn't return to work until Monday 6/1/2020.     Feel free to contact the clinic with any questions or concerns,      Sincerely,     LUISA Lockwood CNP

## 2020-05-29 NOTE — TELEPHONE ENCOUNTER
Reason for call:  Patient reporting a symptom    Symptom or request: Pt says she just got her Covid test back and it was negative.  She says it was done on the 22nd but she just got the results today. She says she wants to know what's wrong. She says her fever broke now but continues to have a severe headache, chest pressure with a deep breath and short of breath. She wonders if she has pneumonia or sinus infection or what is going on.     Duration (how long have symptoms been present):      Have you been treated for this before? Yes    Additional comments:      Phone Number patient can be reached at:  Home number on file [phone] (home)    Best Time:  anytime    Can we leave a detailed message on this number:  YES    Call taken on 5/29/2020 at 2:12 PM by Licha Cristina

## 2020-05-29 NOTE — TELEPHONE ENCOUNTER
Has appointment in 20 minutes. I did notify her via P21 through a message she just sent. Anjali Joseph RN

## 2020-05-29 NOTE — TELEPHONE ENCOUNTER
"Angie calls to state she received the letter about the result of her Covid19 showing her to be negative.  \"My doctor is stating they are not seeing the result in her chart.\"  Angie had her test collected at the [facility] on 5/21/20.    Lab result is in the [facility] Epic chart ([id number]).  See full result below (copied from from her  Epic chart.    Gabriele Wright RN  [employer] [facility]  Emergency Dept Lab Result RN  Ph# [phone]      COVID-19 Virus PCR MRF   Final result        COVID-19 Virus PCR MRF (Order [id number])   Result Notes for COVID-19 Virus PCR MRF     Notes recorded by Rimma Lewis RN on 5/22/2020 at 5:11 PM CDT   Coronavirus (COVID-19) Notification   Your result for COVID-19 is Negative   Letter sent that will serve as a formal notice for your employer            Results   Symptomatic COVID-19 Virus (CORONAVIRUS) PCR (Order [id number])   Linked Results     Procedure  Abnormality  Status    COVID-19 Virus PCR MRF   Final result    Order Questions     Question  Answer  Comment    Asymptomatic or Symptomatic:  Symptomatic     Reason?  No Procedure     Preferred testing location  [facility]             Symptomatic COVID-19 Virus (CORONAVIRUS) PCR   Order: [id number]   Status:  Final result   Visible to patient:  No (not released) Next appt:  None Dx:  Anxiety; Nonspecific syndrome suggest...   Specimen Information:  Respiratory                Specimen Collected: 05/21/20 11:19  Last Resulted: 05/22/20 15:54  Order Details View Encounter Lab and Collection Details Routing Result History             Result Notes for COVID-19 Virus PCR MRF     Notes recorded by Rimma Lewis RN on 5/22/2020 at 5:11 PM CDT   Coronavirus (COVID-19) Notification   Your result for COVID-19 is Negative   Letter sent that will serve as a formal notice for your employer    COVID-19 Virus PCR MRF   Order: [id number] - Part of Panel Order [id number]   Status:  Final result   Visible to patient:  " No (not released) Next appt:  None Dx:  Anxiety; Nonspecific syndrome suggest...   Specimen Information:  Respiratory          Component  5/21/20 1119   COVID-19 VIRUS SPECIMEN SOURCE  Nasopharyngeal     2019-nCOV  Not Detected     Comment: Collection of multiple specimens from the same patient may be necessary to   detect the virus. The possibility of a false negative should be considered if   the patient's recent exposure or clinical presentation suggests 2019 nCOV   infection and diagnostic tests for other causes of illness are negative. Repeat   testing may be considered in this setting.   Viral RNA was extracted via a validated method and subsequently underwent   single step reverse transcriptase-real time polymerase chain reaction using   primers to the CDC specified N1,N2 gene targets of CoV2 and human RNP as an   internal control.   A negative result does not rule out the presence of real-time PCR inhibitors in   the specimen or COVID-19 RNA in concentrations below the limit of detection of   the assay. The possibility of a false negative should be considered if the   patients recent exposure or clinical presentation suggests COVID-19. Additional   testing or repeat testing requires consultation with the laboratory.   Nasopharyngeal specimen is the preferred choice for swab-based SARS CoV2   testing. When collection of a nasopharyngeal swab is not possible the following   are acceptable alternatives:   an oropharyngeal (OP) specimen collected by a healthcare professional, or a   nasal mid-turbinate (NMT) swab collected by a healthcare professional or by   onsite self-collection (using a flocked tapered swab), or an anterior nares   specimen collected by a healthcare professional or by onsite self-collection   (using a round foam swab). (Centers for Disease Control)   Testing performed by [facility], Room 1-210, [address] [place] [address]. This test was developed  and its   performance characteristics determined by the [facility]   Center. It has not been cleared or approved by the FDA.   The laboratory is regulated under the Clinical Laboratory Improvement   Amendments of 1988 (CLIA-88) as qualified to perform high-complexity testing.   This test is used for clinical purposes. It should not be regarded as   investigational or for research.     Performed and/or entered by:   [address] [address]     Resulting Agency  [facility]          Specimen Collected: 05/21/20 11:19  Last Resulted: 05/21/20 15:33  Lab Flowsheet Order Details View Encounter Lab and Collection Details Routing Result History

## 2020-05-30 ASSESSMENT — ASTHMA QUESTIONNAIRES: ACT_TOTALSCORE: 21

## 2020-06-09 ENCOUNTER — TELEPHONE (OUTPATIENT)
Dept: FAMILY MEDICINE | Facility: CLINIC | Age: 25
End: 2020-06-09

## 2020-06-09 NOTE — PROGRESS NOTES
Subjective     Leah Marie Schoenecker is a 25 year old female who presents to clinic today for the following health issues:    HPI   RESPIRATORY SYMPTOMS      Duration: 3 weeks, coughing and chest pain started to worsen around 5/28    Description  cough    Severity: moderate    Accompanying signs and symptoms: None    History (predisposing factors):  Negative COVID test 5/21/2020    Precipitating or alleviating factors: None    Therapies tried and outcome:  Flonase, albuterol inhaler     Started in sinuses and moved into chest  No fevers recently-was 99 for a week    Patient Active Problem List   Diagnosis     Mild major depression (H)     Anxiety     Past Surgical History:   Procedure Laterality Date     COLONOSCOPY N/A 6/10/2015    Procedure: COLONOSCOPY;  Surgeon: Jose Leroy MD;  Location: [facility]     NO HISTORY OF SURGERY         Social History     Tobacco Use     Smoking status: Never Smoker     Smokeless tobacco: Never Used   Substance Use Topics     Alcohol use: Yes     Comment: monthly at most     Family History   Problem Relation Age of Onset     Hypertension Maternal Grandmother      Diabetes Paternal Grandfather      Heart Disease Paternal Grandfather      Bladder Cancer Paternal Grandfather      Other - See Comments Sister         endometrises      Hypertension Mother      Other - See Comments Sister         endometrioses      Thyroid Disease Maternal Aunt          Current Outpatient Medications   Medication Sig Dispense Refill     azithromycin (ZITHROMAX) 250 MG tablet Take 2 tablets (500 mg) by mouth daily for 1 day, THEN 1 tablet (250 mg) daily for 4 days. 6 tablet 0     predniSONE (DELTASONE) 20 MG tablet Take 1 tablet (20 mg) by mouth 2 times daily for 5 days 10 tablet 0     albuterol (PROAIR HFA/PROVENTIL HFA/VENTOLIN HFA) 108 (90 Base) MCG/ACT inhaler Inhale 2 puffs into the lungs every 6 hours as needed for shortness of breath / dyspnea or wheezing 1 Inhaler 0     cyclobenzaprine  (FLEXERIL) 5 MG tablet Take 1-2 tablets (5-10 mg) by mouth 3 times daily as needed for muscle spasms 30 tablet 0     fluticasone (FLONASE) 50 MCG/ACT nasal spray Spray 2 sprays into both nostrils daily 16 g 1     ibuprofen (ADVIL/MOTRIN) 200 MG capsule Take 200 mg by mouth every 4 hours as needed for fever       LESSINA-28 0.1-20 MG-MCG tablet TAKE 1 TABLET BY MOUTH DAILY 84 tablet 1     No Known Allergies    Reviewed and updated as needed this visit by Provider  Tobacco  Allergies  Meds  Problems  Med Hx  Surg Hx  Fam Hx         Review of Systems   Constitutional, HEENT, cardiovascular, pulmonary, gi and gu systems are negative, except as otherwise noted.      Objective    /81   Pulse 85   Temp 99.3  F (37.4  C)   Resp 16   LMP 05/27/2020 (Exact Date)   SpO2 98%   There is no height or weight on file to calculate BMI.  Physical Exam   GENERAL: healthy, alert and no distress  HENT: ear canals and TM's normal, nose and mouth without ulcers or lesions  NECK: no adenopathy, no asymmetry, masses, or scars and thyroid normal to palpation  RESP: lungs clear to auscultation - no rales, rhonchi or wheezes  CV: regular rate and rhythm, normal S1 S2, no S3 or S4, no murmur, click or rub, no peripheral edema and peripheral pulses strong  ABDOMEN: soft, nontender, no hepatosplenomegaly, no masses and bowel sounds normal  PSYCH: mentation appears normal, affect normal/bright    Diagnostic Test Results:  Labs reviewed in Epic        Assessment & Plan     1. Acute bronchitis with symptoms > 10 days  No acute distress.  Exam unremarkable.  Will start azithromycin and prednisone for symptoms.  Symptomatic care and follow up discussed.  - predniSONE (DELTASONE) 20 MG tablet; Take 1 tablet (20 mg) by mouth 2 times daily for 5 days  Dispense: 10 tablet; Refill: 0  - azithromycin (ZITHROMAX) 250 MG tablet; Take 2 tablets (500 mg) by mouth daily for 1 day, THEN 1 tablet (250 mg) daily for 4 days.  Dispense: 6 tablet;  Refill: 0     Home care instructions were reviewed with the patient. The risks, benefits and treatment options of prescribed medications or other treatments have been discussed with the patient. The patient verbalized their understanding and should call or follow up if no improvement or if they develop further problems.    Return in about 1 week (around 6/17/2020), or if symptoms worsen or fail to improve.    LUISA Hernandez [facility]

## 2020-06-09 NOTE — TELEPHONE ENCOUNTER
Reason for Call:  Other appointment    Detailed comments: Pt has called and still has symptoms of Cough, barking cough although did do a COVID Test and it was negative, she is wondering if it could be allergies?  Was Seen on 5/29/20  Please advise    Phone Number Patient can be reached at: Cell number on file:    Telephone Information:   Mobile [phone]       Best Time: any    Can we leave a detailed message on this number? YES    Call taken on 6/9/2020 at 8:08 AM by Hailey Pino

## 2020-06-09 NOTE — TELEPHONE ENCOUNTER
"S-(situation): Pt calling with ongoing upper resp sx.    B-(background): Virtual Visit 5/29/2020 and 5/20/2020. COVID testing done 5/21/2020 was negative.     A-(assessment):   She has been taking Zyrtec every day but yesterday \"because I wanted to give my body a break.\"  Voice is getting hoarse from coughing so much.  She says she did the Renee Pot and it did not provide any relief.  She is doing the salt water gargle rinses.  She is using the Flonase.   Uses the albuterol inhaler only when she gets \"super short of breath.\"  \"Nothing seems to calm it down or stop it.\"  \"My chest is constantly in pain.\"  Asked if she has a temp - \"Not that I've checked recently.\" She does not feel like she has a fever.     R-(recommendations):   The recommendation at the 5/29/2020 Virtual appt was to: 'Return in about 1 week (around 6/5/2020), or if symptoms worsen or fail to improve.'   Last two appts have been Virtual. Face to Face appt made for tomorrow with PCP.    Mira LE RN, BSN        "

## 2020-06-10 ENCOUNTER — OFFICE VISIT (OUTPATIENT)
Dept: FAMILY MEDICINE | Facility: CLINIC | Age: 25
End: 2020-06-10
Payer: COMMERCIAL

## 2020-06-10 VITALS
DIASTOLIC BLOOD PRESSURE: 81 MMHG | SYSTOLIC BLOOD PRESSURE: 121 MMHG | HEART RATE: 85 BPM | OXYGEN SATURATION: 98 % | TEMPERATURE: 99.3 F | RESPIRATION RATE: 16 BRPM

## 2020-06-10 DIAGNOSIS — J20.9 ACUTE BRONCHITIS WITH SYMPTOMS > 10 DAYS: Primary | ICD-10-CM

## 2020-06-10 PROCEDURE — 99213 OFFICE O/P EST LOW 20 MIN: CPT | Performed by: NURSE PRACTITIONER

## 2020-06-10 RX ORDER — AZITHROMYCIN 250 MG/1
TABLET, FILM COATED ORAL
Qty: 6 TABLET | Refills: 0 | Status: SHIPPED | OUTPATIENT
Start: 2020-06-10 | End: 2020-06-15

## 2020-06-10 RX ORDER — PREDNISONE 20 MG/1
20 TABLET ORAL 2 TIMES DAILY
Qty: 10 TABLET | Refills: 0 | Status: SHIPPED | OUTPATIENT
Start: 2020-06-10 | End: 2020-06-15

## 2020-06-14 ENCOUNTER — MYC MEDICAL ADVICE (OUTPATIENT)
Dept: FAMILY MEDICINE | Facility: CLINIC | Age: 25
End: 2020-06-14

## 2020-06-16 ENCOUNTER — ANCILLARY PROCEDURE (OUTPATIENT)
Dept: GENERAL RADIOLOGY | Facility: CLINIC | Age: 25
End: 2020-06-16
Attending: NURSE PRACTITIONER
Payer: COMMERCIAL

## 2020-06-16 ENCOUNTER — TELEPHONE (OUTPATIENT)
Dept: FAMILY MEDICINE | Facility: CLINIC | Age: 25
End: 2020-06-16

## 2020-06-16 DIAGNOSIS — J20.9 ACUTE BRONCHITIS WITH SYMPTOMS > 10 DAYS: Primary | ICD-10-CM

## 2020-06-16 DIAGNOSIS — J20.9 ACUTE BRONCHITIS WITH SYMPTOMS > 10 DAYS: ICD-10-CM

## 2020-06-16 PROCEDURE — 71046 X-RAY EXAM CHEST 2 VIEWS: CPT

## 2020-06-16 NOTE — TELEPHONE ENCOUNTER
Hi,  Angie left a message on the xray phone that she needed to schedule a chest xray.  I reviewed her chart and did not see an order.  Also should she be scheduled as Covid PUI? Even though the test was neg on 5/21.??      Thanks    Lawanda Bowles RT(R)  [facility]  X-Ray [phone]

## 2020-06-17 NOTE — TELEPHONE ENCOUNTER
XR results are negative. No evidence of pnuemonia. I recommend continuing with the treatment course that Angle started.     Viet Castillo PA-C

## 2020-06-17 NOTE — TELEPHONE ENCOUNTER
Viet, patient would like to know Xray results yesterday and plan would be, Angle is out today, would you be able to review this?    MIO Villalba

## 2020-09-25 ENCOUNTER — MYC MEDICAL ADVICE (OUTPATIENT)
Dept: FAMILY MEDICINE | Facility: CLINIC | Age: 25
End: 2020-09-25

## 2020-09-25 DIAGNOSIS — Z20.822 EXPOSURE TO 2019 NOVEL CORONAVIRUS: Primary | ICD-10-CM

## 2020-09-28 DIAGNOSIS — Z20.822 EXPOSURE TO 2019 NOVEL CORONAVIRUS: ICD-10-CM

## 2020-09-28 PROCEDURE — U0003 INFECTIOUS AGENT DETECTION BY NUCLEIC ACID (DNA OR RNA); SEVERE ACUTE RESPIRATORY SYNDROME CORONAVIRUS 2 (SARS-COV-2) (CORONAVIRUS DISEASE [COVID-19]), AMPLIFIED PROBE TECHNIQUE, MAKING USE OF HIGH THROUGHPUT TECHNOLOGIES AS DESCRIBED BY CMS-2020-01-R: HCPCS | Performed by: NURSE PRACTITIONER

## 2020-09-29 LAB
SARS-COV-2 RNA SPEC QL NAA+PROBE: NOT DETECTED
SPECIMEN SOURCE: NORMAL

## 2020-10-04 DIAGNOSIS — Z30.41 ENCOUNTER FOR SURVEILLANCE OF CONTRACEPTIVE PILLS: ICD-10-CM

## 2020-10-05 RX ORDER — LEVONORGESTREL AND ETHINYL ESTRADIOL 0.1-0.02MG
KIT ORAL
Qty: 84 TABLET | Refills: 0 | Status: SHIPPED | OUTPATIENT
Start: 2020-10-05 | End: 2020-12-28

## 2020-12-27 DIAGNOSIS — Z30.41 ENCOUNTER FOR SURVEILLANCE OF CONTRACEPTIVE PILLS: ICD-10-CM

## 2020-12-28 RX ORDER — LEVONORGESTREL AND ETHINYL ESTRADIOL 0.1-0.02MG
KIT ORAL
Qty: 84 TABLET | Refills: 1 | Status: SHIPPED | OUTPATIENT
Start: 2020-12-28 | End: 2021-06-14

## 2021-01-15 ENCOUNTER — HEALTH MAINTENANCE LETTER (OUTPATIENT)
Age: 26
End: 2021-01-15

## 2021-06-11 DIAGNOSIS — Z30.41 ENCOUNTER FOR SURVEILLANCE OF CONTRACEPTIVE PILLS: ICD-10-CM

## 2021-06-14 RX ORDER — TIMOLOL MALEATE 5 MG/ML
SOLUTION/ DROPS OPHTHALMIC
Qty: 84 TABLET | Refills: 0 | Status: SHIPPED | OUTPATIENT
Start: 2021-06-14 | End: 2022-01-10

## 2021-06-20 NOTE — LETTER
Letter by Rimma Lewis RN at      Author: Rimma Lewis RN Service: -- Author Type: --    Filed:  Encounter Date: 5/22/2020 Status: (Other)       5/22/2020        Leah M Schoenecker  [address] [facility] [address]    This letter provides a written record that you were tested for COVID-19 on 5/21/2020.     Your result was negative.    This means that we didnt find the virus that causes COVID-19 in your sample. A test may show negative when you do actually have the virus. This can happen when the virus is in the early stages of infection, before you feel illness symptoms.    Even if you dont have symptoms, they may still appear. For safety, its very important to follow these rules.    Keep yourself away from others (self-isolation):      Stay home. Dont go to work, school or anywhere else.     Stay in your own room (and use your own bathroom), if you can.    Stay away from others in your home. No hugging, kissing or shaking hands. No visitors.    Clean high touch surfaces often (doorknobs, counters, handles, etc.). Use a household cleaning spray or wipes.    Cover your mouth and nose with a mask, tissue or washcloth to avoid spreading germs.    Wash your hands and face often with soap and water.    Stay in self-isolation until you meet ALL of the guidelines below:    1. You have had no fever for at least 72 hours (that is 3 full days of no fever without the use of medicine that reduces fevers), AND  2. other symptoms (such as cough, shortness of breath) have gotten better, AND  3. at least 10 days have passed since your symptoms first appeared.    Going back to work  Check with your employer for any guidelines to follow for going back to work.    Employers: This document serves as formal notice that your employee tested negative for COVID-19, as of the testing date shown above.    For questions regarding this letter or your Negative COVID-19 result, call [phone] between 8A to 6:30P (M-F) and 10A to  6:30P (weekends).

## 2021-09-04 ENCOUNTER — HEALTH MAINTENANCE LETTER (OUTPATIENT)
Age: 26
End: 2021-09-04

## 2021-11-09 ENCOUNTER — TRANSFERRED RECORDS (OUTPATIENT)
Dept: HEALTH INFORMATION MANAGEMENT | Facility: CLINIC | Age: 26
End: 2021-11-09
Payer: COMMERCIAL

## 2021-11-17 ENCOUNTER — TRANSCRIBE ORDERS (OUTPATIENT)
Dept: OTHER | Age: 26
End: 2021-11-17
Payer: COMMERCIAL

## 2021-11-17 ENCOUNTER — TELEPHONE (OUTPATIENT)
Dept: OPHTHALMOLOGY | Facility: CLINIC | Age: 26
End: 2021-11-17
Payer: COMMERCIAL

## 2021-11-17 DIAGNOSIS — R51.9 HEADACHE: ICD-10-CM

## 2021-11-17 DIAGNOSIS — H47.339 PSEUDOPAPILLEDEMA: Primary | ICD-10-CM

## 2021-11-17 NOTE — TELEPHONE ENCOUNTER
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

## 2021-11-17 NOTE — TELEPHONE ENCOUNTER
Called and unable to LVM do to pts request.     Will try again.     Neda Toscano Communication Facilitator on 11/17/2021 at 2:03 PM

## 2021-12-06 ENCOUNTER — OFFICE VISIT (OUTPATIENT)
Dept: OPHTHALMOLOGY | Facility: CLINIC | Age: 26
End: 2021-12-06
Attending: OPHTHALMOLOGY
Payer: COMMERCIAL

## 2021-12-06 DIAGNOSIS — H47.333 PSEUDOPAPILLEDEMA, BILATERAL: Primary | ICD-10-CM

## 2021-12-06 DIAGNOSIS — H53.40 VISUAL FIELD DEFECT: Primary | ICD-10-CM

## 2021-12-06 DIAGNOSIS — H53.10 SUBJECTIVE VISUAL DISTURBANCE: ICD-10-CM

## 2021-12-06 DIAGNOSIS — G44.86 HEADACHE, CERVICOGENIC: ICD-10-CM

## 2021-12-06 PROCEDURE — 92004 COMPRE OPH EXAM NEW PT 1/>: CPT | Mod: GC | Performed by: OPHTHALMOLOGY

## 2021-12-06 PROCEDURE — 92133 CPTRZD OPH DX IMG PST SGM ON: CPT | Performed by: OPHTHALMOLOGY

## 2021-12-06 PROCEDURE — 92250 FUNDUS PHOTOGRAPHY W/I&R: CPT | Mod: XU | Performed by: OPHTHALMOLOGY

## 2021-12-06 PROCEDURE — 92083 EXTENDED VISUAL FIELD XM: CPT | Performed by: OPHTHALMOLOGY

## 2021-12-06 PROCEDURE — G0463 HOSPITAL OUTPT CLINIC VISIT: HCPCS

## 2021-12-06 ASSESSMENT — CUP TO DISC RATIO
OD_RATIO: 0.1
OS_RATIO: 0.1

## 2021-12-06 ASSESSMENT — VISUAL ACUITY
CORRECTION_TYPE: GLASSES
METHOD: SNELLEN - LINEAR
OD_CC: 20/20
OS_CC: 20/20

## 2021-12-06 ASSESSMENT — EXTERNAL EXAM - RIGHT EYE: OD_EXAM: NORMAL

## 2021-12-06 ASSESSMENT — CONF VISUAL FIELD
OD_NORMAL: 1
METHOD: COUNTING FINGERS
OS_NORMAL: 1

## 2021-12-06 ASSESSMENT — REFRACTION_WEARINGRX
OS_SPHERE: -1.00
OS_CYLINDER: SPHERE
OD_SPHERE: -1.00
OD_AXIS: 084
SPECS_TYPE: SVL
OD_CYLINDER: +0.25

## 2021-12-06 ASSESSMENT — TONOMETRY
OD_IOP_MMHG: 11
IOP_METHOD: ICARE
OS_IOP_MMHG: 13

## 2021-12-06 ASSESSMENT — SLIT LAMP EXAM - LIDS
COMMENTS: NORMAL
COMMENTS: NORMAL

## 2021-12-06 ASSESSMENT — EXTERNAL EXAM - LEFT EYE: OS_EXAM: NORMAL

## 2021-12-06 NOTE — NURSING NOTE
Chief Complaints and History of Present Illnesses   Patient presents with     Subjective visual disturbance      Chief Complaint(s) and History of Present Illness(es)     Subjective visual disturbance               Comments     New patient evaluation of visual field defect per Soheila Evans at [facility].  Patient has constant headaches and light sensitivity that worsens with headache.  Wakes up in the morning with headache and lasts 4-5 days.  Denies tinnitus.  Hurts to touch the top of her head when she has headache.  Blurred vision every once in a while.  Unsure if diplopia.  Was told her nerves are bunched up or swollen.  Eye meds: none  Tima Azar, CO 12/6/2021 9:09 AM

## 2021-12-06 NOTE — Clinical Note
12/6/2021       RE: Leah Marie Schoenecker  [address]  [facility] [address]     Dear Colleague,    Thank you for referring your patient, Leah Marie Schoenecker, to the [facility] at [facility]. Please see a copy of my visit note below.         Assessment & Plan     Leah Marie Schoenecker is a 26 year old female with the following diagnoses:   1. Pseudopapilledema, bilateral    2. Headache, cervicogenic         Patient was sent for consultation by Dr. Soheila Evans for papilledema versus pseudopapilledema.     HPI:  Saw her optometrist Dr. vEans for routine appointment. This is the first time this optometrist saw her.     Daily headaches. Headaches never totally go away. She gets nausea, light and sound sensitivity, blurred and tunnel vision (goes away within seconds). Possible gets pounding noise in her ears that goes with heartbeat when headaches get worse with an increased throbbing sensation in her head. Takes ibuprofen or Excedrin 2-3x per week. Denies double vision, pulsatile tinnitus, TVOs. ROS negative for use of oral antibiotics for acne of the tetracycline class, retinoid acne creams, oral vitamin A supplements. No history of smoking or personal or family history of blood clots.     Never had MRI brain.     Independent historians:  Patient    Review of outside testing:  Reviewed [facility] VF 11/9/21, OCT unreadable - VF normal    My interpretation performed today of outside testing:  normal    Review of outside clinical notes:  Dr. Soheila Evans clinic notes - refer to N for pap vs pseudopapilledema    Past medical history:  Concussion    Medications:   albuterol (PROAIR HFA/PROVENTIL HFA/VENTOLIN HFA) 108 (90 Base) MCG/ACT inhaler, Inhale 2 puffs into the lungs every 6 hours as needed for shortness of breath / dyspnea or wheezing  ibuprofen (ADVIL/MOTRIN) 200 MG capsule, Take 200 mg by mouth every 4 hours as needed for  fever  VIENVA 0.1-20 MG-MCG tablet, TAKE 1 TABLET BY MOUTH DAILY  cyclobenzaprine (FLEXERIL) 5 MG tablet, Take 1-2 tablets (5-10 mg) by mouth 3 times daily as needed for muscle spasms  fluticasone (FLONASE) 50 MCG/ACT nasal spray, Spray 2 sprays into both nostrils daily    No current facility-administered medications on file prior to visit.      Family history / social history:  Denies Glaucoma, ARMD    Exam:  Visual acuity 20/20 right eye 20/20 left eye.  Color vision 11/11 right eye and 11/11 left eye.  Pupils ERRL  Intraocular pressure 11 right eye and 13 left eye.  Anterior segment exam normal.  Fundus exam nasal elevation both discs positive SVPs right eye no vessel obscuration.  Strabismus exam  full    Tests ordered and interpreted today:  OCT RNFL normal both eyes  OCT macula normal both eyes  OCT volume normal both eyes  FAF question drusen inferonasal macula O.D.    Discussion of management / interpretation with another provider:   None    Assessment/Plan:   It is my impression that patient has pseudopapilledema.  She has elevated optic nerves in both eyes.  There is mild blurring of the disc margins.  There are clear spontaneous venous pulsations and her nerve fiber layer falls within the normal range.  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  She denies symptoms of increased intracranial pressure besides headache.  She states that the headaches have been going on ever since she can remember from when she was a little girl.  They have not changed substantially recently.    She has tenderness over the greater occipital nerves bilaterally.  We discussed treatment options for this including massage therapy, chiropractic therapy, physical therapy, nerve blocks, and seeing a headache neurologist.  We also discussed addressing posture and ergonomics.  She will consider these options and let me know if I can help her proceed in any way.            Attending Physician Attestation:   Complete documentation of historical and exam elements from today's encounter can be found in the full encounter summary report (not reduplicated in this progress note).  I personally obtained the chief complaint(s) and history of present illness.  I confirmed and edited as necessary the review of systems, past medical/surgical history, family history, social history, and examination findings as documented by others; and I examined the patient myself.  I personally reviewed the relevant tests, images, and reports as documented above.  I formulated and edited as necessary the assessment and plan and discussed the findings and management plan with the patient and family. I personally reviewed the ophthalmic test(s) associated with this encounter, agree with the interpretation(s) as documented by the resident/fellow, and have edited the corresponding report(s) as necessary.  - Jackson Garsia, DO   PGY5                Again, thank you for allowing me to participate in the care of your patient.      Sincerely,    Jackson Gomez MD

## 2021-12-06 NOTE — PROGRESS NOTES
Assessment & Plan     Leah Marie Schoenecker is a 26 year old female with the following diagnoses:   1. Pseudopapilledema, bilateral    2. Headache, cervicogenic         Patient was sent for consultation by Dr. Soheila Evans for papilledema versus pseudopapilledema.     HPI:  Saw her optometrist Dr. Evans for routine appointment. This is the first time this optometrist saw her.     Daily headaches. Headaches never totally go away. She gets nausea, light and sound sensitivity, blurred and tunnel vision (goes away within seconds). Possible gets pounding noise in her ears that goes with heartbeat when headaches get worse with an increased throbbing sensation in her head. Takes ibuprofen or Excedrin 2-3x per week. Denies double vision, pulsatile tinnitus, TVOs. ROS negative for use of oral antibiotics for acne of the tetracycline class, retinoid acne creams, oral vitamin A supplements. No history of smoking or personal or family history of blood clots.     Never had MRI brain.     Independent historians:  Patient    Review of outside testing:  Reviewed [facility] VF 11/9/21, OCT unreadable - VF normal    My interpretation performed today of outside testing:  normal    Review of outside clinical notes:  Dr. Soheila Evans clinic notes - refer to [facility] for pap vs pseudopapilledema    Past medical history:  Concussion    Medications:   albuterol (PROAIR HFA/PROVENTIL HFA/VENTOLIN HFA) 108 (90 Base) MCG/ACT inhaler, Inhale 2 puffs into the lungs every 6 hours as needed for shortness of breath / dyspnea or wheezing  ibuprofen (ADVIL/MOTRIN) 200 MG capsule, Take 200 mg by mouth every 4 hours as needed for fever  VIENVA 0.1-20 MG-MCG tablet, TAKE 1 TABLET BY MOUTH DAILY  cyclobenzaprine (FLEXERIL) 5 MG tablet, Take 1-2 tablets (5-10 mg) by mouth 3 times daily as needed for muscle spasms  fluticasone (FLONASE) 50 MCG/ACT nasal spray, Spray 2 sprays into both nostrils daily    No current facility-administered  medications on file prior to visit.      Family history / social history:  Denies Glaucoma, ARMD    Exam:  Visual acuity 20/20 right eye 20/20 left eye.  Color vision 11/11 right eye and 11/11 left eye.  Pupils ERRL  Intraocular pressure 11 right eye and 13 left eye.  Anterior segment exam normal.  Fundus exam nasal elevation both discs positive SVPs right eye no vessel obscuration.  Strabismus exam  full    Tests ordered and interpreted today:  OCT RNFL normal both eyes  OCT macula normal both eyes  OCT volume normal both eyes  FAF question drusen inferonasal macula O.D.    Discussion of management / interpretation with another provider:   None    Assessment/Plan:   It is my impression that patient has pseudopapilledema.  She has elevated optic nerves in both eyes.  There is mild blurring of the disc margins.  There are clear spontaneous venous pulsations and her nerve fiber layer falls within the normal range.  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  She denies symptoms of increased intracranial pressure besides headache.  She states that the headaches have been going on ever since she can remember from when she was a little girl.  They have not changed substantially recently.    She has tenderness over the greater occipital nerves bilaterally.  We discussed treatment options for this including massage therapy, chiropractic therapy, physical therapy, nerve blocks, and seeing a headache neurologist.  We also discussed addressing posture and ergonomics.  She will consider these options and let me know if I can help her proceed in any way.            Attending Physician Attestation:  Complete documentation of historical and exam elements from today's encounter can be found in the full encounter summary report (not reduplicated in this progress note).  I personally obtained the chief complaint(s) and history of present illness.  I confirmed and edited as necessary the review of  systems, past medical/surgical history, family history, social history, and examination findings as documented by others; and I examined the patient myself.  I personally reviewed the relevant tests, images, and reports as documented above.  I formulated and edited as necessary the assessment and plan and discussed the findings and management plan with the patient and family. I personally reviewed the ophthalmic test(s) associated with this encounter, agree with the interpretation(s) as documented by the resident/fellow, and have edited the corresponding report(s) as necessary.  - Jackson Garsia, DO   PGY5

## 2021-12-06 NOTE — LETTER
2021         RE:  :  MRN: Leah Marie Schoenecker  1995  [id number]     Dear Dr. Evans,    Thank you for asking me to see your very pleasant patient, Leah Marie Schoenecker, in neuro-ophthalmic consultation.  I would like to thank you for sending your records and I have summarized them in the history of present illness. My assessment and plan are below.  For further details, please see my attached clinic note.      Assessment & Plan     Leah Marie Schoenecker is a 26 year old female with the following diagnoses:   1. Pseudopapilledema, bilateral    2. Headache, cervicogenic         Patient was sent for consultation by Dr. Soheila Evans for papilledema versus pseudopapilledema.     HPI:  Saw her optometrist Dr. Evans for routine appointment. This is the first time this optometrist saw her.     Daily headaches. Headaches never totally go away. She gets nausea, light and sound sensitivity, blurred and tunnel vision (goes away within seconds). Possible gets pounding noise in her ears that goes with heartbeat when headaches get worse with an increased throbbing sensation in her head. Takes ibuprofen or Excedrin 2-3x per week. Denies double vision, pulsatile tinnitus, TVOs. ROS negative for use of oral antibiotics for acne of the tetracycline class, retinoid acne creams, oral vitamin A supplements. No history of smoking or personal or family history of blood clots.     Never had MRI brain.     Independent historians:  Patient    Review of outside testing:  Reviewed [facility] VF 21, OCT unreadable - VF normal    My interpretation performed today of outside testing:  normal    Review of outside clinical notes:  Dr. Soheila Evans clinic notes - refer to N for pap vs pseudopapilledema    Past medical history:  Concussion    Medications:   albuterol (PROAIR HFA/PROVENTIL HFA/VENTOLIN HFA) 108 (90 Base) MCG/ACT inhaler, Inhale 2 puffs into the lungs every 6 hours as needed for shortness of breath /  dyspnea or wheezing  ibuprofen (ADVIL/MOTRIN) 200 MG capsule, Take 200 mg by mouth every 4 hours as needed for fever  VIENVA 0.1-20 MG-MCG tablet, TAKE 1 TABLET BY MOUTH DAILY  cyclobenzaprine (FLEXERIL) 5 MG tablet, Take 1-2 tablets (5-10 mg) by mouth 3 times daily as needed for muscle spasms  fluticasone (FLONASE) 50 MCG/ACT nasal spray, Spray 2 sprays into both nostrils daily    No current facility-administered medications on file prior to visit.      Family history / social history:  Denies Glaucoma, ARMD    Exam:  Visual acuity 20/20 right eye 20/20 left eye.  Color vision 11/11 right eye and 11/11 left eye.  Pupils ERRL  Intraocular pressure 11 right eye and 13 left eye.  Anterior segment exam normal.  Fundus exam nasal elevation both discs positive SVPs right eye no vessel obscuration.  Strabismus exam  full    Tests ordered and interpreted today:  OCT RNFL normal both eyes  OCT macula normal both eyes  OCT volume normal both eyes  FAF question drusen inferonasal macula O.D.    Discussion of management / interpretation with another provider:   None    Assessment/Plan:   It is my impression that patient has pseudopapilledema.  She has elevated optic nerves in both eyes.  There is mild blurring of the disc margins.  There are clear spontaneous venous pulsations and her nerve fiber layer falls within the normal range.  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  She denies symptoms of increased intracranial pressure besides headache.  She states that the headaches have been going on ever since she can remember from when she was a little girl.  They have not changed substantially recently.    She has tenderness over the greater occipital nerves bilaterally.  We discussed treatment options for this including massage therapy, chiropractic therapy, physical therapy, nerve blocks, and seeing a headache neurologist.  We also discussed addressing posture and ergonomics.  She will consider  these options and let me know if I can help her proceed in any way.          Again, thank you for allowing me to participate in the care of your patient.      Sincerely,    Jackson Gomez MD  Professor  Ophthalmology Residency   Director of Neuro-Ophthalmology  [facility] [facility] Endowed Chair  Departments of Ophthalmology, Neurology, and Neurosurgery  [facility] [address] [place]  [address]  T - [phone]  F - [phone]  [email or web address] [email or web address]      CC: Soheila Evans, OD  [facility]  [address] [place] [facility] [address]  Via Fax: [phone]     Jaimee Kaplan, LUISA CNP  [address] [facility] [address]  Via In Basket    DX = pseudopapilledema

## 2022-01-10 ENCOUNTER — OFFICE VISIT (OUTPATIENT)
Dept: FAMILY MEDICINE | Facility: CLINIC | Age: 27
End: 2022-01-10
Payer: COMMERCIAL

## 2022-01-10 VITALS
HEIGHT: 67 IN | RESPIRATION RATE: 16 BRPM | BODY MASS INDEX: 32.96 KG/M2 | SYSTOLIC BLOOD PRESSURE: 110 MMHG | WEIGHT: 210 LBS | DIASTOLIC BLOOD PRESSURE: 72 MMHG | HEART RATE: 64 BPM | TEMPERATURE: 97.9 F

## 2022-01-10 DIAGNOSIS — Z00.00 ROUTINE GENERAL MEDICAL EXAMINATION AT A HEALTH CARE FACILITY: Primary | ICD-10-CM

## 2022-01-10 DIAGNOSIS — N76.0 BACTERIAL VAGINOSIS: ICD-10-CM

## 2022-01-10 DIAGNOSIS — B96.89 BACTERIAL VAGINOSIS: ICD-10-CM

## 2022-01-10 DIAGNOSIS — F32.0 MILD MAJOR DEPRESSION (H): ICD-10-CM

## 2022-01-10 DIAGNOSIS — N89.8 VAGINAL DISCHARGE: ICD-10-CM

## 2022-01-10 DIAGNOSIS — Z30.013 ENCOUNTER FOR INITIAL PRESCRIPTION OF INJECTABLE CONTRACEPTIVE: ICD-10-CM

## 2022-01-10 DIAGNOSIS — Z01.419 ENCOUNTER FOR GYNECOLOGICAL EXAMINATION WITHOUT ABNORMAL FINDING: ICD-10-CM

## 2022-01-10 LAB
CLUE CELLS: PRESENT
HCG UR QL: NEGATIVE
TRICHOMONAS, WET PREP: ABNORMAL
WBC'S/HIGH POWER FIELD, WET PREP: ABNORMAL
YEAST, WET PREP: ABNORMAL

## 2022-01-10 PROCEDURE — G0145 SCR C/V CYTO,THINLAYER,RESCR: HCPCS | Performed by: NURSE PRACTITIONER

## 2022-01-10 PROCEDURE — 87624 HPV HI-RISK TYP POOLED RSLT: CPT | Performed by: NURSE PRACTITIONER

## 2022-01-10 PROCEDURE — 99395 PREV VISIT EST AGE 18-39: CPT | Mod: 25 | Performed by: NURSE PRACTITIONER

## 2022-01-10 PROCEDURE — 96372 THER/PROPH/DIAG INJ SC/IM: CPT | Performed by: NURSE PRACTITIONER

## 2022-01-10 PROCEDURE — 81025 URINE PREGNANCY TEST: CPT | Performed by: NURSE PRACTITIONER

## 2022-01-10 PROCEDURE — 87210 SMEAR WET MOUNT SALINE/INK: CPT | Performed by: NURSE PRACTITIONER

## 2022-01-10 RX ORDER — METRONIDAZOLE 500 MG/1
500 TABLET ORAL 2 TIMES DAILY
Qty: 14 TABLET | Refills: 0 | Status: SHIPPED | OUTPATIENT
Start: 2022-01-10 | End: 2022-01-17

## 2022-01-10 RX ORDER — MEDROXYPROGESTERONE ACETATE 150 MG/ML
150 INJECTION, SUSPENSION INTRAMUSCULAR
Status: ACTIVE | OUTPATIENT
Start: 2022-01-10 | End: 2023-01-05

## 2022-01-10 RX ADMIN — MEDROXYPROGESTERONE ACETATE 150 MG: 150 INJECTION, SUSPENSION INTRAMUSCULAR at 11:44

## 2022-01-10 ASSESSMENT — ENCOUNTER SYMPTOMS
COUGH: 0
FEVER: 0
HEMATURIA: 0
PALPITATIONS: 0
DIARRHEA: 0
BREAST MASS: 0
PARESTHESIAS: 0
NERVOUS/ANXIOUS: 0
NAUSEA: 0
DIZZINESS: 0
JOINT SWELLING: 0
SORE THROAT: 0
ARTHRALGIAS: 0
SHORTNESS OF BREATH: 0
HEARTBURN: 0
MYALGIAS: 0
WEAKNESS: 0
DYSURIA: 0
HEMATOCHEZIA: 0
ABDOMINAL PAIN: 0
CHILLS: 0
EYE PAIN: 0
HEADACHES: 1
FREQUENCY: 0
CONSTIPATION: 1

## 2022-01-10 ASSESSMENT — ANXIETY QUESTIONNAIRES
7. FEELING AFRAID AS IF SOMETHING AWFUL MIGHT HAPPEN: SEVERAL DAYS
2. NOT BEING ABLE TO STOP OR CONTROL WORRYING: MORE THAN HALF THE DAYS
1. FEELING NERVOUS, ANXIOUS, OR ON EDGE: MORE THAN HALF THE DAYS
GAD7 TOTAL SCORE: 12
3. WORRYING TOO MUCH ABOUT DIFFERENT THINGS: NEARLY EVERY DAY
6. BECOMING EASILY ANNOYED OR IRRITABLE: SEVERAL DAYS
5. BEING SO RESTLESS THAT IT IS HARD TO SIT STILL: MORE THAN HALF THE DAYS

## 2022-01-10 ASSESSMENT — PATIENT HEALTH QUESTIONNAIRE - PHQ9
5. POOR APPETITE OR OVEREATING: SEVERAL DAYS
SUM OF ALL RESPONSES TO PHQ QUESTIONS 1-9: 6

## 2022-01-10 ASSESSMENT — MIFFLIN-ST. JEOR: SCORE: 1720.18

## 2022-01-10 NOTE — PATIENT INSTRUCTIONS
Flagyl sent to the pharmacy for bacterial vaginosis    Preventive Health Recommendations  Female Ages 26 - 39  Yearly exam:   See your health care provider every year in order to    Review health changes.     Discuss preventive care.      Review your medicines if you your doctor has prescribed any.    Until age 30: Get a Pap test every three years (more often if you have had an abnormal result).    After age 30: Talk to your doctor about whether you should have a Pap test every 3 years or have a Pap test with HPV screening every 5 years.   You do not need a Pap test if your uterus was removed (hysterectomy) and you have not had cancer.  You should be tested each year for STDs (sexually transmitted diseases), if you're at risk.   Talk to your provider about how often to have your cholesterol checked.  If you are at risk for diabetes, you should have a diabetes test (fasting glucose).  Shots: Get a flu shot each year. Get a tetanus shot every 10 years.   Nutrition:     Eat at least 5 servings of fruits and vegetables each day.    Eat whole-grain bread, whole-wheat pasta and brown rice instead of white grains and rice.    Get adequate Calcium and Vitamin D.     Lifestyle    Exercise at least 150 minutes a week (30 minutes a day, 5 days of the week). This will help you control your weight and prevent disease.    Limit alcohol to one drink per day.    No smoking.     Wear sunscreen to prevent skin cancer.    See your dentist every six months for an exam and cleaning.

## 2022-01-10 NOTE — PROGRESS NOTES
SUBJECTIVE:   CC: Leah Marie Schoenecker is an 26 year old woman who presents for preventive health visit.     Patient has been advised of split billing requirements and indicates understanding: Yes     Healthy Habits:     Getting at least 3 servings of Calcium per day:  Yes    Bi-annual eye exam:  Yes    Dental care twice a year:  Yes    Sleep apnea or symptoms of sleep apnea:  None    Diet:  Gluten-free/reduced    Frequency of exercise:  2-3 days/week    Duration of exercise:  30-45 minutes    Taking medications regularly:  Yes    PHQ-2 Total Score: 1    Additional concerns today:  Yes    Contraception - Stopped oral pills 5 months ago due to them upsetting her stomach. States when pharmacy changed brand is when this happened   Periods regular  Painful cycles when not on birth control     Today's PHQ-2 Score:   PHQ-2 ( 1999 [employer]) 1/10/2022   Q1: Little interest or pleasure in doing things 1   Q2: Feeling down, depressed or hopeless 0   PHQ-2 Score 1   Q1: Little interest or pleasure in doing things Several days   Q2: Feeling down, depressed or hopeless Not at all   PHQ-2 Score 1       Abuse: Current or Past (Physical, Sexual or Emotional) - No  Do you feel safe in your environment? Yes    Have you ever done Advance Care Planning? (For example, a Health Directive, POLST, or a discussion with a medical provider or your loved ones about your wishes): No, advance care planning information given to patient to review.  Patient declined advance care planning discussion at this time.    Social History     Tobacco Use     Smoking status: Never Smoker     Smokeless tobacco: Never Used   Substance Use Topics     Alcohol use: Yes     Comment: monthly at most         Alcohol Use 1/10/2022   Prescreen: >3 drinks/day or >7 drinks/week? No   Prescreen: >3 drinks/day or >7 drinks/week? -       Reviewed orders with patient.  Reviewed health maintenance and updated orders accordingly - Yes  Labs reviewed in EPIC    Breast  Cancer Screening:    FHS-7:   Breast CA Risk Assessment (FHS-7) 1/10/2022   Did any of your first-degree relatives have breast or ovarian cancer? Yes   Did any of your relatives have bilateral breast cancer? No   Did any man in your family have breast cancer? No   Did any woman in your family have breast and ovarian cancer? Yes   Did any woman in your family have breast cancer before age 50 y? No   Do you have 2 or more relatives with breast and/or ovarian cancer? Yes   Do you have 2 or more relatives with breast and/or bowel cancer? No     Maternal Grandmother with ovarian CA 60's  No breast cancer    Patient under 40 years of age: Routine Mammogram Screening not recommended.   Pertinent mammograms are reviewed under the imaging tab.    History of abnormal Pap smear: NO - age 21-29 PAP every 3 years recommended  PAP / HPV 6/3/2016   PAP (Historical) NIL     Reviewed and updated as needed this visit by clinical staff  Tobacco  Allergies  Meds   Med Hx  Surg Hx  Fam Hx  Soc Hx       Reviewed and updated as needed this visit by Provider                   Review of Systems   Constitutional: Negative for chills and fever.   HENT: Negative for congestion, ear pain, hearing loss and sore throat.    Eyes: Negative for pain and visual disturbance.   Respiratory: Negative for cough and shortness of breath.    Cardiovascular: Negative for chest pain, palpitations and peripheral edema.   Gastrointestinal: Positive for constipation. Negative for abdominal pain, diarrhea, heartburn, hematochezia and nausea.   Breasts:  Negative for tenderness, breast mass and discharge.   Genitourinary: Negative for dysuria, frequency, genital sores, hematuria, pelvic pain, urgency, vaginal bleeding and vaginal discharge.   Musculoskeletal: Negative for arthralgias, joint swelling and myalgias.   Skin: Negative for rash.   Neurological: Positive for headaches. Negative for dizziness, weakness and paresthesias.   Psychiatric/Behavioral:  "Negative for mood changes. The patient is not nervous/anxious.       OBJECTIVE:   /72 (Cuff Size: Adult Large)   Pulse 64   Temp 97.9  F (36.6  C) (Tympanic)   Resp 16   Ht 1.702 m (5' 7\")   Wt 95.3 kg (210 lb)   LMP 12/27/2021 (Approximate)   BMI 32.89 kg/m    Physical Exam  GENERAL: healthy, alert and no distress  EYES: Eyes grossly normal to inspection, PERRL and conjunctivae and sclerae normal  HENT: ear canals and TM's normal, nose and mouth without ulcers or lesions  NECK: no adenopathy, no asymmetry, masses, or scars and thyroid normal to palpation  RESP: lungs clear to auscultation - no rales, rhonchi or wheezes  CV: regular rate and rhythm, normal S1 S2, no S3 or S4, no murmur, click or rub, no peripheral edema and peripheral pulses strong  ABDOMEN: soft, nontender, no hepatosplenomegaly, no masses and bowel sounds normal   (female): normal female external genitalia, normal urethral meatus, vaginal mucosa pink, moist, well rugated, and normal cervix/adnexa/uterus without masses, moderate amount of thick white discharge present  MS: no gross musculoskeletal defects noted, no edema  SKIN: no suspicious lesions or rashes  NEURO: Normal strength and tone, mentation intact and speech normal  PSYCH: mentation appears normal, affect normal/bright    Diagnostic Test Results:  Results for orders placed or performed in visit on 01/10/22   HCG qualitative urine     Status: Normal   Result Value Ref Range    hCG Urine Qualitative Negative Negative   Wet prep - Clinic Collect     Status: Abnormal    Specimen: Vagina; Swab   Result Value Ref Range    Trichomonas Absent Absent    Yeast Absent Absent    Clue Cells Present (A) Absent    WBCs/high power field None None       ASSESSMENT/PLAN:   (Z00.00) Routine general medical examination at a health care facility  (primary encounter diagnosis)    (Z01.419) Encounter for gynecological examination without abnormal finding  Comment: updated today  Plan: " "Gynecologic Cytology (PAP)          (N89.8) Vaginal discharge  Comment: BV on wet prep  Plan: Wet prep - Clinic Collect          (Z30.013) Encounter for initial prescription of injectable contraceptive  Comment: Has struggled with oral contraceptives and there are side effects.  Will do a trial of Depo.  Risks and benefits of medication discussed including but not limited to increased risk of bone thinning with prolonged use.  Plan: HCG qualitative urine, medroxyPROGESTERone        (DEPO-PROVERA) injection 150 mg         (N76.0,  B96.89) Bacterial vaginosis  Comment: Flagyl sent to the pharmacy for BV on wet prep.  Plan: metroNIDAZOLE (FLAGYL) 500 MG tablet    (F32.0) Mild major depression (H)  Stable.  PHQ-9 completed today.           Patient has been advised of split billing requirements and indicates understanding: Yes  COUNSELING:  Reviewed preventive health counseling, as reflected in patient instructions    Estimated body mass index is 32.89 kg/m  as calculated from the following:    Height as of this encounter: 1.702 m (5' 7\").    Weight as of this encounter: 95.3 kg (210 lb).      She reports that she has never smoked. She has never used smokeless tobacco.      Counseling Resources:  ATP IV Guidelines  Pooled Cohorts Equation Calculator  Breast Cancer Risk Calculator  BRCA-Related Cancer Risk Assessment: FHS-7 Tool  FRAX Risk Assessment  ICSI Preventive Guidelines  Dietary Guidelines for Americans, 2010  USDA's MyPlate  ASA Prophylaxis  Lung CA Screening    LUISA Hernandez CNP  [facility]  "

## 2022-01-10 NOTE — NURSING NOTE
Clinic Administered Medication Documentation    Administrations This Visit     medroxyPROGESTERone (DEPO-PROVERA) injection 150 mg     Admin Date  01/10/2022 Action  Given Dose  150 mg Route  Intramuscular Site  Right Ventrogluteal Administered By  Katie Wilson CMA    Ordering Provider: Jaimee Kaplan APRN CNP    Patient Supplied?: No                  Depo Provera Documentation    URINE HCG: negative    Depo-Provera Standing Order inclusion/exclusion criteria reviewed.   Patient meets: inclusion criteria     BP: 110/72  LAST PAP/EXAM:   Lab Results   Component Value Date    PAP NIL 06/03/2016       Prior to injection, verified patient identity using patient's name and date of birth. Medication was administered. Please see MAR and medication order for additional information.     Was entire vial of medication used? Yes  Vial/Syringe: Single dose vial  Expiration Date:  8/23    Patient instructed to remain in clinic for 15 minutes, report any adverse reaction to staff immediately  and stay in clinic after the injection but patient declined.  NEXT INJECTION DUE: 3/28/22 - 4/11/22    Katie Wilson CMA

## 2022-01-11 ASSESSMENT — ANXIETY QUESTIONNAIRES: GAD7 TOTAL SCORE: 12

## 2022-01-12 LAB
BKR LAB AP GYN ADEQUACY: NORMAL
BKR LAB AP GYN INTERPRETATION: NORMAL
BKR LAB AP HPV REFLEX: NORMAL
BKR LAB AP PREVIOUS ABNORMAL: NORMAL
PATH REPORT.COMMENTS IMP SPEC: NORMAL
PATH REPORT.COMMENTS IMP SPEC: NORMAL
PATH REPORT.RELEVANT HX SPEC: NORMAL

## 2022-01-13 LAB
HUMAN PAPILLOMA VIRUS 16 DNA: NEGATIVE
HUMAN PAPILLOMA VIRUS 18 DNA: NEGATIVE
HUMAN PAPILLOMA VIRUS FINAL DIAGNOSIS: NORMAL
HUMAN PAPILLOMA VIRUS OTHER HR: NEGATIVE

## 2022-03-28 ENCOUNTER — ALLIED HEALTH/NURSE VISIT (OUTPATIENT)
Dept: FAMILY MEDICINE | Facility: CLINIC | Age: 27
End: 2022-03-28
Payer: COMMERCIAL

## 2022-03-28 VITALS — SYSTOLIC BLOOD PRESSURE: 126 MMHG | DIASTOLIC BLOOD PRESSURE: 72 MMHG

## 2022-03-28 DIAGNOSIS — Z30.9 CONTRACEPTIVE MANAGEMENT: Primary | ICD-10-CM

## 2022-03-28 PROCEDURE — 99207 PR NO CHARGE NURSE ONLY: CPT

## 2022-03-28 PROCEDURE — 96372 THER/PROPH/DIAG INJ SC/IM: CPT | Performed by: NURSE PRACTITIONER

## 2022-03-28 RX ADMIN — MEDROXYPROGESTERONE ACETATE 150 MG: 150 INJECTION, SUSPENSION INTRAMUSCULAR at 09:55

## 2022-03-28 NOTE — NURSING NOTE
Clinic Administered Medication Documentation    Administrations This Visit     medroxyPROGESTERone (DEPO-PROVERA) injection 150 mg     Admin Date  03/28/2022 Action  Given Dose  150 mg Route  Intramuscular Site  Left Ventrogluteal Administered By  Gudelia Kiran CMA    Ordering Provider: Jaimee Kaplan APRN CNP    Patient Supplied?: No                  Depo Provera Documentation    URINE HCG: not indicated    Depo-Provera Standing Order inclusion/exclusion criteria reviewed.   Patient meets: inclusion criteria     BP: 126/72  LAST PAP/EXAM:   Lab Results   Component Value Date    PAP NIL 06/03/2016       Prior to injection, verified patient identity using patient's name and date of birth. Medication was administered. Please see MAR and medication order for additional information.     Was entire vial of medication used? Yes  Vial/Syringe: Single dose vial  Expiration Date:  7/2023    Patient instructed to report any adverse reaction to staff immediately .  NEXT INJECTION DUE: 6/13/22 - 6/27/22

## 2022-04-03 ENCOUNTER — MYC MEDICAL ADVICE (OUTPATIENT)
Dept: FAMILY MEDICINE | Facility: CLINIC | Age: 27
End: 2022-04-03
Payer: COMMERCIAL

## 2022-04-03 DIAGNOSIS — B37.31 CANDIDIASIS OF VAGINA: Primary | ICD-10-CM

## 2022-04-05 RX ORDER — FLUCONAZOLE 150 MG/1
150 TABLET ORAL ONCE
Qty: 1 TABLET | Refills: 0 | Status: SHIPPED | OUTPATIENT
Start: 2022-04-05 | End: 2022-04-05

## 2022-05-06 ENCOUNTER — NURSE TRIAGE (OUTPATIENT)
Dept: NURSING | Facility: CLINIC | Age: 27
End: 2022-05-06

## 2022-05-06 ENCOUNTER — E-VISIT (OUTPATIENT)
Dept: URGENT CARE | Facility: CLINIC | Age: 27
End: 2022-05-06
Payer: COMMERCIAL

## 2022-05-06 DIAGNOSIS — Z20.822 CLOSE EXPOSURE TO 2019 NOVEL CORONAVIRUS: Primary | ICD-10-CM

## 2022-05-06 PROCEDURE — 99421 OL DIG E/M SVC 5-10 MIN: CPT | Performed by: FAMILY MEDICINE

## 2022-05-06 NOTE — TELEPHONE ENCOUNTER
Pt informed. States sister is a nurse who has been monitoring sx, VS.  Advise ED for eval as recommended per Angle.  MICHAEL Wagner RN

## 2022-05-06 NOTE — PATIENT INSTRUCTIONS
Dear Angie,    Based on your exposure to COVID-19 (coronavirus), we would like to test you for this virus. I have placed an order for this test. The best time for testing is 5-7 days after the exposure.    How to schedule:  Go to your [employer] home page and scroll down to the section that says  You have an appointment that needs to be scheduled  and click the large green button that says  Schedule Now  and follow the steps to find the next available opening.     If you are unable to complete these [employer] scheduling steps, please call [phone] to schedule your testing.     Return to work/school/ guidance:   Please let your workplace manager and staffing office know when your quarantine ends. We cannot give you an exact date as it depends on the information below. You can calculate this on your own or work with your manager/staffing office to calculate this.  These guidelines are for quarantining before returning to work, school or .     For employers, schools and day cares: This is an official notice for this person s medical guidelines for returning in-person.     For health care sites: The CDC gives different isolation and quarantine guidelines for healthcare sites, please check with these sites before arriving.     How and when do I quarantine?  You quarantine when you may have been exposed to the virus and DON T have symptoms.     Stay home and away from others.     You must quarantine for 5 days after your last contact with a person who has COVID.  o Note: If you are up to date with vaccines, you don t need to quarantine. You should still follow the steps below.     Wear a mask for 10 full days any time you re around others.    Get tested at least 5 days after you were exposed, even if you don t have symptoms.     If you start to have symptoms, isolate right away and get tested.    What are the symptoms of COVID-19?  Symptoms can include fever, cough, shortness of breath, chills, headache,  muscle pain, sore throat, fatigue, runny or stuffy nose, and loss of taste and smell. Other less common symptoms include nausea, vomiting, or diarrhea (watery stools).    If you develop symptoms, follow these guidelines:    If you're normally healthy: Please start another eVisit.    If you have a serious health problem (like cancer, heart failure, an organ transplant or kidney disease): Call your specialty clinic. Let them know that you might have COVID-19.    Where can I get more information?    [facility] - About COVID-19: [email or web address]     CDC - What to Do If You're Sick: https://www.cdc.gov/coronavirus/2019-ncov/if-you-are-sick/index.html     CDC - Quarantine & Isolation: https://www.cdc.gov/coronavirus/2019-ncov/your-health/quarantine-isolation.html     [facility] clinical trials (COVID-19 research studies): clinicalaffairs.[facility]-clinical-trials    Below are the COVID-19 hotlines at the [facility] ([facility]). Interpreters are available.  o For health questions: Call 944-666-0931 or 1-765.721.5641 (7 a.m. to 7 p.m.)  o For questions about schools and childcare: Call 516-809-3625 or 1-510.939.2141 (7 a.m. to 7 p.m.)

## 2022-05-06 NOTE — CONFIDENTIAL NOTE
Tested positive for Covid-19 on 05/02/22.   Symptoms sore throat congestion headache, fever 99.0's by heart rate is elevated 110, 02 levels 95-99%, heart rate 122  Triage guidelines recommend to see HCP ( or pcp triage) within 4 hours  No appointments for Virtual visit with provider for Covid-19 treatment until Monday 05/09/22.   Timeframe for treatment is within 5 days and patient would be on day 7.  COVID 19 Nurse Triage Plan/Patient Instructions    Please be aware that novel coronavirus (COVID-19) may be circulating in the community. If you develop symptoms such as fever, cough, or SOB or if you have concerns about the presence of another infection including coronavirus (COVID-19), please contact your health care provider or visit [email or web address].     Disposition/Instructions    Virtual Visit with provider recommended. Reference Visit Selection Guide.    Thank you for taking steps to prevent the spread of this virus.  o Limit your contact with others.  o Wear a simple mask to cover your cough.  o Wash your hands well and often.    Resources    [facility]: About COVID-19: www."[email or web address][email or web address]/    CDC: What to Do If You're Sick: www.cdc.gov/coronavirus/2019-ncov/about/steps-when-sick.html    CDC: Ending Home Isolation: www.cdc.gov/coronavirus/2019-ncov/hcp/disposition-in-home-patients.html     CDC: Caring for Someone: www.cdc.gov/coronavirus/2019-ncov/if-you-are-sick/care-for-someone.html     [facility]: Interim Guidance for Hospital Discharge to Home: www.health.[facility].[place]/diseases/coronavirus/hcp/hospdischarge.pdf    [facility] clinical trials (COVID-19 research studies): clinicalaffairs.[facility]-clinical-trials     Below are the COVID-19 hotlines at the [place] [facility] ([facility]). Interpreters are available.   o For health questions: Call 840-962-7809 or 1-477.316.1381 (7 a.m. to 7 p.m.)  o For questions about schools and childcare: Call 669-840-1721 or  3-538-301-7811 (7 a.m. to 7 p.m.)

## 2022-05-06 NOTE — TELEPHONE ENCOUNTER
Provider Response to 2nd Level Triage Request    I have reviewed the RN documentation. My recommendation is:    If she has an elevated heart rate and shortness of breath she should be evaluated in the ED.    LUISA Hernandez CNP

## 2022-06-28 ENCOUNTER — ALLIED HEALTH/NURSE VISIT (OUTPATIENT)
Dept: FAMILY MEDICINE | Facility: CLINIC | Age: 27
End: 2022-06-28
Payer: COMMERCIAL

## 2022-06-28 VITALS — DIASTOLIC BLOOD PRESSURE: 70 MMHG | SYSTOLIC BLOOD PRESSURE: 110 MMHG

## 2022-06-28 DIAGNOSIS — Z30.40 CONTRACEPTIVE SURVEILLANCE: Primary | ICD-10-CM

## 2022-06-28 LAB — HCG UR QL: NEGATIVE

## 2022-06-28 PROCEDURE — 81025 URINE PREGNANCY TEST: CPT

## 2022-06-28 PROCEDURE — 99207 PR NO CHARGE NURSE ONLY: CPT

## 2022-06-28 ASSESSMENT — ANXIETY QUESTIONNAIRES
GAD7 TOTAL SCORE: 11
1. FEELING NERVOUS, ANXIOUS, OR ON EDGE: MORE THAN HALF THE DAYS
GAD7 TOTAL SCORE: 11
3. WORRYING TOO MUCH ABOUT DIFFERENT THINGS: MORE THAN HALF THE DAYS
6. BECOMING EASILY ANNOYED OR IRRITABLE: MORE THAN HALF THE DAYS
7. FEELING AFRAID AS IF SOMETHING AWFUL MIGHT HAPPEN: MORE THAN HALF THE DAYS
2. NOT BEING ABLE TO STOP OR CONTROL WORRYING: SEVERAL DAYS
5. BEING SO RESTLESS THAT IT IS HARD TO SIT STILL: SEVERAL DAYS

## 2022-06-28 ASSESSMENT — PATIENT HEALTH QUESTIONNAIRE - PHQ9
SUM OF ALL RESPONSES TO PHQ QUESTIONS 1-9: 9
5. POOR APPETITE OR OVEREATING: SEVERAL DAYS

## 2022-06-28 NOTE — COMMUNITY RESOURCES LIST (ENGLISH)
06/28/2022   [facility] - Outpatient Clinics  Toya Rosaline  For questions about this resource list or additional care needs, please contact your primary care clinic or care manager.  Phone: [phone]   Email: N/A   Address: [address] [place] [address]   Hours: N/A        Hotlines and Helplines       Hotline - Crisis help  1  [facility] - Mobile Crisis Response Distance: 1.15 miles      COVID-19 Status: Phone/Virtual   [address] [place] [address]  Language: English  Hours: Mon - Sun Open 24 Hours  Fees: Insurance   Phone: (226) 427-8050 Email: info@[employer] Website: https://www.[employer]location/aaahbywqb-okowah-vqdwdv-services-only/     2  [facility] Distance: 1.18 miles      COVID-19 Status: Phone/Virtual   [address] [place] [address]  Language: English  Hours: Mon - Sun Open 24 Hours   Phone: (815) 523-6933 Email: rob@[facility] Website: [email or web address]://www.[facility]./170/Family-Services          Mental Health       Individual counseling  3  [facility] Distance: 0.91 miles      COVID-19 Status: Regular Operations, COVID-19 Status: Phone/Virtual   [address] [place] [address]  Language: English  Hours: Mon - Fri 8:00 AM - 5:00 PM  Fees: Insurance, Self Pay   Phone: (470) 661-8732 Email: bessy@"[employer] Website: http://"[employer]/     4  [facility] Office Distance: 1.18 miles      COVID-19 Status: Regular Operations, COVID-19 Status: Phone/Virtual   [address] [place] [address]  Language: English  Hours: Mon - Fri 8:00 AM - 5:00 PM  Fees: Insurance, Self Pay   Phone: (123) 484-4676 Email: info@[employer]net Website: https://www.[employer]/     Mental health crisis care  5  [facility]  Assessment and Stabilization Services Distance: 11.11 miles      COVID-19 Status: Regular Operations, COVID-19 Status: Phone/Virtual   [address] [address]  Language: English  Hours: Mon - Sun Open 24 Hours  Fees: Insurance, Self Pay, Sliding Fee   Phone: (176) 432-4379 Email: info@[employer] Website: https://www.[employer]/location/[employer]/          Important Numbers & Websites       Emergency Services   911  [facility] Services   311  Poison Control   (192) 948-4071  Suicide Prevention Lifeline   (986) 494-4250 (TALK)  Child Abuse Hotline   (807) 455-9343 (4-A-Child)  Sexual Assault Hotline   (725) 875-1924 (HOPE)  National Runaway Safeline   (818) 732-1563 (RUNAWAY)  All-Options Talkline   (282) 856-1103  Substance Abuse Referral   (484) 789-6986 (HELP)

## 2022-06-28 NOTE — PROGRESS NOTES
Clinic Administered Medication Documentation          Depo Provera Documentation    URINE HCG: negative    Depo-Provera Standing Order inclusion/exclusion criteria reviewed.   Patient meets: inclusion criteria     BP: 110/70  LAST PAP/EXAM:   Lab Results   Component Value Date    PAP NIL 06/03/2016       Prior to injection, verified patient identity using patient's name and date of birth. Medication was administered. Please see MAR and medication order for additional information.     Was entire vial of medication used? Yes  Vial/Syringe: Single dose vial  Expiration Date:  03/2023    Patient instructed to remain in clinic for 15 minutes.  NEXT INJECTION DUE: 9/13/22 - 9/27/22

## 2022-09-08 NOTE — TELEPHONE ENCOUNTER
Called and spoke to Angie.     I made her an appt with Dr. Gomez on 12/6 @ 9:30 AM. I Mailed out a new pt packet with time, date and a map.     Neda Toscano Communication Facilitator on 11/17/2021 at 2:54 PM     n/a

## 2022-10-11 ENCOUNTER — ALLIED HEALTH/NURSE VISIT (OUTPATIENT)
Dept: FAMILY MEDICINE | Facility: CLINIC | Age: 27
End: 2022-10-11
Payer: COMMERCIAL

## 2022-10-11 VITALS — DIASTOLIC BLOOD PRESSURE: 74 MMHG | SYSTOLIC BLOOD PRESSURE: 112 MMHG

## 2022-10-11 DIAGNOSIS — Z30.42 ENCOUNTER FOR SURVEILLANCE OF INJECTABLE CONTRACEPTIVE: Primary | ICD-10-CM

## 2022-10-11 LAB — HCG UR QL: NEGATIVE

## 2022-10-11 PROCEDURE — 81025 URINE PREGNANCY TEST: CPT

## 2022-10-11 PROCEDURE — 96372 THER/PROPH/DIAG INJ SC/IM: CPT | Performed by: NURSE PRACTITIONER

## 2022-10-11 PROCEDURE — 99207 PR NO CHARGE NURSE ONLY: CPT

## 2022-10-11 RX ADMIN — MEDROXYPROGESTERONE ACETATE 150 MG: 150 INJECTION, SUSPENSION INTRAMUSCULAR at 09:15

## 2022-10-11 NOTE — PROGRESS NOTES
Clinic Administered Medication Documentation    Administrations This Visit     medroxyPROGESTERone (DEPO-PROVERA) injection 150 mg     Admin Date  10/11/2022 Action  Given Dose  150 mg Route  Intramuscular Site   Administered By  Toya Waggoner CMA    Ordering Provider: Jaimee Kaplan APRN CNP    Patient Supplied?: No                  Depo Provera Documentation    URINE HCG: negative    Depo-Provera Standing Order inclusion/exclusion criteria reviewed.   Patient meets: inclusion criteria     BP: 112/74  LAST PAP/EXAM:   Lab Results   Component Value Date    PAP NIL 06/03/2016       Prior to injection, verified patient identity using patient's name and date of birth. Medication was administered. Please see MAR and medication order for additional information.     Was entire vial of medication used? Yes  Vial/Syringe: Single dose vial  Expiration Date:  04/2024    Patient instructed to .  NEXT INJECTION DUE: 12/27/22 - 1/10/23    Left ventrogluteal

## 2022-10-22 ENCOUNTER — HEALTH MAINTENANCE LETTER (OUTPATIENT)
Age: 27
End: 2022-10-22

## 2022-12-06 ENCOUNTER — MYC REFILL (OUTPATIENT)
Dept: FAMILY MEDICINE | Facility: CLINIC | Age: 27
End: 2022-12-06

## 2022-12-06 DIAGNOSIS — J01.10 ACUTE FRONTAL SINUSITIS, RECURRENCE NOT SPECIFIED: ICD-10-CM

## 2022-12-08 RX ORDER — ALBUTEROL SULFATE 90 UG/1
2 AEROSOL, METERED RESPIRATORY (INHALATION) EVERY 6 HOURS PRN
Qty: 6.7 G | Refills: 0 | Status: SHIPPED | OUTPATIENT
Start: 2022-12-08

## 2023-01-23 ENCOUNTER — ALLIED HEALTH/NURSE VISIT (OUTPATIENT)
Dept: FAMILY MEDICINE | Facility: CLINIC | Age: 28
End: 2023-01-23
Payer: COMMERCIAL

## 2023-01-23 DIAGNOSIS — Z30.42 ENCOUNTER FOR SURVEILLANCE OF INJECTABLE CONTRACEPTIVE: Primary | ICD-10-CM

## 2023-01-23 LAB — HCG UR QL: NEGATIVE

## 2023-01-23 PROCEDURE — 96372 THER/PROPH/DIAG INJ SC/IM: CPT | Performed by: NURSE PRACTITIONER

## 2023-01-23 PROCEDURE — 99207 PR NO CHARGE NURSE ONLY: CPT

## 2023-01-23 PROCEDURE — 81025 URINE PREGNANCY TEST: CPT

## 2023-01-23 RX ORDER — MEDROXYPROGESTERONE ACETATE 150 MG/ML
150 INJECTION, SUSPENSION INTRAMUSCULAR
Status: COMPLETED | OUTPATIENT
Start: 2023-01-23 | End: 2023-01-23

## 2023-01-23 RX ADMIN — MEDROXYPROGESTERONE ACETATE 150 MG: 150 INJECTION, SUSPENSION INTRAMUSCULAR at 11:42

## 2023-01-23 NOTE — PROGRESS NOTES
Clinic Administered Medication Documentation    Administrations This Visit     medroxyPROGESTERone (DEPO-PROVERA) injection 150 mg     Admin Date  01/23/2023 Action  Given Dose  150 mg Route  Intramuscular Site  Right Ventrogluteal Administered By  Kahler, Bailee    Ordering Provider: Jaimee Kaplan APRN CNP    NDC: 25998-489-73    Lot#: [id number]    : [facility]    Patient Supplied?: No                  Depo Provera Documentation    URINE HCG: negative    Depo-Provera Standing Order inclusion/exclusion criteria reviewed.   Patient meets: inclusion criteria     BP: Data Unavailable  LAST PAP/EXAM:   Lab Results   Component Value Date    PAP NIL 06/03/2016       Prior to injection, verified patient identity using patient's name and date of birth. Medication was administered. Please see MAR and medication order for additional information.     Was entire vial of medication used? Yes  Vial/Syringe: Single dose vial  Expiration Date:  04/2024    Patient instructed to remain in clinic for 15 minutes and report any adverse reaction to staff immediately .  NEXT INJECTION DUE: 4/10/23 - 4/24/23    Bailee Kahler on 1/23/2023 at 11:44 AM

## 2023-02-22 ENCOUNTER — E-VISIT (OUTPATIENT)
Dept: FAMILY MEDICINE | Facility: CLINIC | Age: 28
End: 2023-02-22
Payer: COMMERCIAL

## 2023-02-22 DIAGNOSIS — J01.90 ACUTE SINUSITIS WITH SYMPTOMS > 10 DAYS: Primary | ICD-10-CM

## 2023-02-22 PROCEDURE — 99421 OL DIG E/M SVC 5-10 MIN: CPT | Performed by: NURSE PRACTITIONER

## 2023-02-23 NOTE — PATIENT INSTRUCTIONS
Sinusitis (Antibiotic Treatment)    The sinuses are air-filled spaces within the bones of the face. They connect to the inside of the nose. Sinusitis is an inflammation of the tissue that lines the sinuses. Sinusitis can occur during a cold. It can also happen due to allergies to pollens and other particles in the air. Sinusitis can cause symptoms of sinus congestion and a feeling of fullness. A sinus infection causes fever, headache, and facial pain. There is often green or yellow fluid draining from the nose or into the back of the throat (post-nasal drip). You have been given antibiotics to treat this condition.   Home care    Take the full course of antibiotics as instructed. Don't stop taking them, even when you feel better.    Drink plenty of water, hot tea, and other liquids as directed by the healthcare provider. This may help thin nasal mucus. It also may help your sinuses drain fluids.    Heat may help soothe painful areas of your face. Use a towel soaked in hot water. Or,  the shower and direct the warm spray onto your face. Using a vaporizer along with a menthol rub at night may also help soothe symptoms.     An expectorant with guaifenesin may help thin nasal mucus and help your sinuses drain fluids. Talk with your provider or pharmacists before taking an over-the-counter (OTC) medicine if you have any questions about it or its side effects..    You can use an OTC decongestant, unless a similar medicine was prescribed to you. Nasal sprays work the fastest. Use one that contains phenylephrine or oxymetazoline. First blow your nose gently. Then use the spray. Don't use these medicines more often than directed on the label. If you do, your symptoms may get worse. You may also take pills that contain pseudoephedrine. Don t use products that combine multiple medicines. This is because side effects may be increased. Read labels. You can also ask the pharmacist for help. (People with high blood  pressure should not use decongestants. They can raise blood pressure.) Talk with your provider or pharmacist if you have any questions about the medicine..    OTC antihistamines may help if allergies contributed to your sinusitis. Talk with your provider or pharmacist if you have any questions about the medicine..    Don't use nasal rinses or irrigation during an acute sinus infection, unless your healthcare provider tells you to. Rinsing may spread the infection to other areas in your sinuses.    Use acetaminophen or ibuprofen to control pain, unless another pain medicine was prescribed to you. If you have chronic liver or kidney disease or ever had a stomach ulcer, talk with your healthcare provider before using these medicines. Never give aspirin to anyone under age 18 who is ill with a fever. It may cause severe liver damage.    Don't smoke. This can make symptoms worse.    Follow-up care  Follow up with your healthcare provider, or as advised.   When to seek medical advice  Call your healthcare provider if any of these occur:     Facial pain or headache that gets worse    Stiff neck    Unusual drowsiness or confusion    Swelling of your forehead or eyelids    Symptoms don't go away in 10 days    Vision problems, such as blurred or double vision    Fever of 100.4 F (38 C) or higher, or as directed by your healthcare provider  Call 911  Call 911 if any of these occur:     Seizure    Trouble breathing    Feeling dizzy or faint    Fingernails, skin or lips look blue, purple , or gray  Prevention  Here are steps you can take to help prevent an infection:     Keep good hand washing habits.    Don t have close contact with people who have sore throats, colds, or other upper respiratory infections.    Don t smoke, and stay away from secondhand smoke.    Stay up to date with of your vaccines.  [employer] last reviewed this educational content on 12/1/2019 2000-2021 [employer]. All rights reserved. This  information is not intended as a substitute for professional medical care. Always follow your healthcare professional's instructions.        Dear Leah Marie Schoenecker    After reviewing your responses, I've been able to diagnose you with Acute sinusitis with symptoms > 10 days.      Based on your responses and diagnosis, I have prescribed   Orders Placed This Encounter     amoxicillin-clavulanate (AUGMENTIN) 875-125 MG tablet    to treat your symptoms. I have sent this to your pharmacy.?     It is also important to stay well hydrated, get lots of rest and take over-the-counter decongestants,?tylenol?or ibuprofen if you?are able to?take those medications per your primary care provider to help relieve discomfort.?     It is important that you take?all of?your prescribed medication even if your symptoms are improving after a few doses.? Taking?all of?your medicine helps prevent the symptoms from returning.?     If your symptoms worsen, you develop severe headache, vomiting, high fever (>102), or are not improving in 7 days, please contact your primary care provider for an appointment or visit any of our convenient Walk-in Care or Urgent Care Centers to be seen which can be found on our website?here.?     Thanks again for choosing?us?as your health care partner,?   ?  LUISA Hernandez CNP?

## 2023-04-01 ENCOUNTER — HEALTH MAINTENANCE LETTER (OUTPATIENT)
Age: 28
End: 2023-04-01

## 2023-06-05 ASSESSMENT — ENCOUNTER SYMPTOMS
FREQUENCY: 0
ARTHRALGIAS: 0
DIZZINESS: 1
FEVER: 1
HEADACHES: 1
ABDOMINAL PAIN: 0
BREAST MASS: 0
DYSURIA: 0
NAUSEA: 0
HEARTBURN: 1
CONSTIPATION: 1
WEAKNESS: 0
HEMATOCHEZIA: 0
COUGH: 0
EYE PAIN: 0
NERVOUS/ANXIOUS: 0
PALPITATIONS: 0
SHORTNESS OF BREATH: 1
PARESTHESIAS: 0
DIARRHEA: 0
CHILLS: 1
MYALGIAS: 0
JOINT SWELLING: 0
SORE THROAT: 1
HEMATURIA: 0

## 2023-06-05 ASSESSMENT — PATIENT HEALTH QUESTIONNAIRE - PHQ9
SUM OF ALL RESPONSES TO PHQ QUESTIONS 1-9: 8
10. IF YOU CHECKED OFF ANY PROBLEMS, HOW DIFFICULT HAVE THESE PROBLEMS MADE IT FOR YOU TO DO YOUR WORK, TAKE CARE OF THINGS AT HOME, OR GET ALONG WITH OTHER PEOPLE: SOMEWHAT DIFFICULT
SUM OF ALL RESPONSES TO PHQ QUESTIONS 1-9: 8

## 2023-06-06 ENCOUNTER — OFFICE VISIT (OUTPATIENT)
Dept: FAMILY MEDICINE | Facility: CLINIC | Age: 28
End: 2023-06-06
Payer: COMMERCIAL

## 2023-06-06 VITALS
BODY MASS INDEX: 34.06 KG/M2 | DIASTOLIC BLOOD PRESSURE: 72 MMHG | SYSTOLIC BLOOD PRESSURE: 112 MMHG | HEIGHT: 67 IN | WEIGHT: 217 LBS | HEART RATE: 78 BPM | OXYGEN SATURATION: 98 % | RESPIRATION RATE: 16 BRPM | TEMPERATURE: 97.6 F

## 2023-06-06 DIAGNOSIS — R53.83 FATIGUE, UNSPECIFIED TYPE: ICD-10-CM

## 2023-06-06 DIAGNOSIS — Z30.42 ENCOUNTER FOR SURVEILLANCE OF INJECTABLE CONTRACEPTIVE: ICD-10-CM

## 2023-06-06 DIAGNOSIS — Z11.4 SCREENING FOR HIV (HUMAN IMMUNODEFICIENCY VIRUS): ICD-10-CM

## 2023-06-06 DIAGNOSIS — Z11.59 NEED FOR HEPATITIS C SCREENING TEST: ICD-10-CM

## 2023-06-06 DIAGNOSIS — Z13.220 LIPID SCREENING: ICD-10-CM

## 2023-06-06 DIAGNOSIS — Z00.00 ROUTINE GENERAL MEDICAL EXAMINATION AT A HEALTH CARE FACILITY: Primary | ICD-10-CM

## 2023-06-06 DIAGNOSIS — F32.0 MILD MAJOR DEPRESSION (H): ICD-10-CM

## 2023-06-06 DIAGNOSIS — R07.0 THROAT PAIN: ICD-10-CM

## 2023-06-06 LAB
ALBUMIN SERPL BCG-MCNC: 4.7 G/DL (ref 3.5–5.2)
ALP SERPL-CCNC: 64 U/L (ref 35–104)
ALT SERPL W P-5'-P-CCNC: 19 U/L (ref 10–35)
ANION GAP SERPL CALCULATED.3IONS-SCNC: 11 MMOL/L (ref 7–15)
AST SERPL W P-5'-P-CCNC: 20 U/L (ref 10–35)
BILIRUB SERPL-MCNC: 0.4 MG/DL
BUN SERPL-MCNC: 18.7 MG/DL (ref 6–20)
CALCIUM SERPL-MCNC: 9.7 MG/DL (ref 8.6–10)
CHLORIDE SERPL-SCNC: 105 MMOL/L (ref 98–107)
CHOLEST SERPL-MCNC: 194 MG/DL
CREAT SERPL-MCNC: 0.8 MG/DL (ref 0.51–0.95)
DEPRECATED CALCIDIOL+CALCIFEROL SERPL-MC: 31 UG/L (ref 20–75)
DEPRECATED HCO3 PLAS-SCNC: 25 MMOL/L (ref 22–29)
DEPRECATED S PYO AG THROAT QL EIA: NEGATIVE
ERYTHROCYTE [DISTWIDTH] IN BLOOD BY AUTOMATED COUNT: 12.4 % (ref 10–15)
GFR SERPL CREATININE-BSD FRML MDRD: >90 ML/MIN/1.73M2
GLUCOSE SERPL-MCNC: 93 MG/DL (ref 70–99)
GROUP A STREP BY PCR: NOT DETECTED
HCG UR QL: NEGATIVE
HCT VFR BLD AUTO: 39.8 % (ref 35–47)
HCV AB SERPL QL IA: NONREACTIVE
HDLC SERPL-MCNC: 41 MG/DL
HGB BLD-MCNC: 13.1 G/DL (ref 11.7–15.7)
HIV 1+2 AB+HIV1 P24 AG SERPL QL IA: NONREACTIVE
LDLC SERPL CALC-MCNC: 135 MG/DL
MCH RBC QN AUTO: 31.7 PG (ref 26.5–33)
MCHC RBC AUTO-ENTMCNC: 32.9 G/DL (ref 31.5–36.5)
MCV RBC AUTO: 96 FL (ref 78–100)
NONHDLC SERPL-MCNC: 153 MG/DL
PLATELET # BLD AUTO: 240 10E3/UL (ref 150–450)
POTASSIUM SERPL-SCNC: 4.7 MMOL/L (ref 3.4–5.3)
PROT SERPL-MCNC: 8 G/DL (ref 6.4–8.3)
RBC # BLD AUTO: 4.13 10E6/UL (ref 3.8–5.2)
SODIUM SERPL-SCNC: 141 MMOL/L (ref 136–145)
TRIGL SERPL-MCNC: 89 MG/DL
TSH SERPL DL<=0.005 MIU/L-ACNC: 1.12 UIU/ML (ref 0.3–4.2)
WBC # BLD AUTO: 5.7 10E3/UL (ref 4–11)

## 2023-06-06 PROCEDURE — 99395 PREV VISIT EST AGE 18-39: CPT | Mod: 25 | Performed by: NURSE PRACTITIONER

## 2023-06-06 PROCEDURE — 87389 HIV-1 AG W/HIV-1&-2 AB AG IA: CPT | Performed by: NURSE PRACTITIONER

## 2023-06-06 PROCEDURE — 84443 ASSAY THYROID STIM HORMONE: CPT | Performed by: NURSE PRACTITIONER

## 2023-06-06 PROCEDURE — 99214 OFFICE O/P EST MOD 30 MIN: CPT | Mod: 25 | Performed by: NURSE PRACTITIONER

## 2023-06-06 PROCEDURE — 96372 THER/PROPH/DIAG INJ SC/IM: CPT | Performed by: NURSE PRACTITIONER

## 2023-06-06 PROCEDURE — 80053 COMPREHEN METABOLIC PANEL: CPT | Performed by: NURSE PRACTITIONER

## 2023-06-06 PROCEDURE — 85027 COMPLETE CBC AUTOMATED: CPT | Performed by: NURSE PRACTITIONER

## 2023-06-06 PROCEDURE — 82306 VITAMIN D 25 HYDROXY: CPT | Performed by: NURSE PRACTITIONER

## 2023-06-06 PROCEDURE — 81025 URINE PREGNANCY TEST: CPT | Performed by: NURSE PRACTITIONER

## 2023-06-06 PROCEDURE — 36415 COLL VENOUS BLD VENIPUNCTURE: CPT | Performed by: NURSE PRACTITIONER

## 2023-06-06 PROCEDURE — 80061 LIPID PANEL: CPT | Performed by: NURSE PRACTITIONER

## 2023-06-06 PROCEDURE — 86803 HEPATITIS C AB TEST: CPT | Performed by: NURSE PRACTITIONER

## 2023-06-06 PROCEDURE — 87651 STREP A DNA AMP PROBE: CPT | Performed by: NURSE PRACTITIONER

## 2023-06-06 RX ORDER — MEDROXYPROGESTERONE ACETATE 150 MG/ML
150 INJECTION, SUSPENSION INTRAMUSCULAR
Status: ACTIVE | OUTPATIENT
Start: 2023-06-06 | End: 2024-05-31

## 2023-06-06 RX ADMIN — MEDROXYPROGESTERONE ACETATE 150 MG: 150 INJECTION, SUSPENSION INTRAMUSCULAR at 10:24

## 2023-06-06 ASSESSMENT — ENCOUNTER SYMPTOMS
FEVER: 1
CHILLS: 1
DIZZINESS: 1
MYALGIAS: 0
PARESTHESIAS: 0
SHORTNESS OF BREATH: 1
JOINT SWELLING: 0
EYE PAIN: 0
COUGH: 0
DIARRHEA: 0
FREQUENCY: 0
SORE THROAT: 1
ARTHRALGIAS: 0
HEMATURIA: 0
ABDOMINAL PAIN: 0
BREAST MASS: 0
DYSURIA: 0
CONSTIPATION: 1
HEARTBURN: 1
NAUSEA: 0
PALPITATIONS: 0
WEAKNESS: 0
HEMATOCHEZIA: 0
HEADACHES: 1
NERVOUS/ANXIOUS: 0

## 2023-06-06 ASSESSMENT — PAIN SCALES - GENERAL: PAINLEVEL: SEVERE PAIN (6)

## 2023-06-06 NOTE — PROGRESS NOTES
SUBJECTIVE:   CC: Angie is an 28 year old who presents for preventive health visit.       6/6/2023     9:09 AM   Additional Questions   Roomed by Cristiana     Healthy Habits:     Getting at least 3 servings of Calcium per day:  Yes    Bi-annual eye exam:  Yes    Dental care twice a year:  NO    Sleep apnea or symptoms of sleep apnea:  Daytime drowsiness    Diet:  Gluten-free/reduced    Frequency of exercise:  2-3 days/week    Duration of exercise:  30-45 minutes    Taking medications regularly:  Yes    Medication side effects:  Not applicable    PHQ-2 Total Score: 1    Additional concerns today:  Yes  Contraception  Associated symptoms include chest pain, chills, a fever, headaches and a sore throat. Pertinent negatives include no abdominal pain, arthralgias, congestion, coughing, joint swelling, myalgias, nausea, rash or weakness.     Medication Followup of Depo Provera     Taking Medication as prescribed: NO-missed last depo inj     Side Effects:  Pt feels this is making her gain wt     Medication Helping Symptoms:  Yes    Helping with the abdominal pain and bloating, moods    Wt Readings from Last 3 Encounters:   06/06/23 98.4 kg (217 lb)   01/10/22 95.3 kg (210 lb)   03/23/20 81.2 kg (179 lb)       Social History     Tobacco Use     Smoking status: Never     Smokeless tobacco: Never   Vaping Use     Vaping status: Never Used   Substance Use Topics     Alcohol use: Not Currently     Comment: monthly at most         6/5/2023     8:50 PM   Alcohol Use   Prescreen: >3 drinks/day or >7 drinks/week? No     Reviewed orders with patient.  Reviewed health maintenance and updated orders accordingly - Yes  Labs reviewed in EPIC    Breast Cancer Screening:    FHS-7:       1/10/2022     9:58 AM 6/5/2023     8:53 PM   Breast CA Risk Assessment (FHS-7)   Did any of your first-degree relatives have breast or ovarian cancer? Yes Yes   Did any of your relatives have bilateral breast cancer? No No   Did any man in your family have  breast cancer? No No   Did any woman in your family have breast and ovarian cancer? Yes Yes   Did any woman in your family have breast cancer before age 50 y? No No   Do you have 2 or more relatives with breast and/or ovarian cancer? Yes Unknown   Do you have 2 or more relatives with breast and/or bowel cancer? No Unknown     Grandmother in her 70's  No one with breast and ovarian     Pertinent mammograms are reviewed under the imaging tab.    History of abnormal Pap smear: NO - age 21-29 PAP every 3 years recommended      Latest Ref Rng & Units 1/10/2022    10:44 AM 6/3/2016    12:00 AM   PAP / HPV   PAP  Negative for Intraepithelial Lesion or Malignancy (NILM)      PAP (Historical)   NIL     HPV 16 DNA Negative Negative      HPV 18 DNA Negative Negative      Other HR HPV Negative Negative        Reviewed and updated as needed this visit by clinical staff   Tobacco  Allergies  Meds              Reviewed and updated as needed this visit by Provider                   Review of Systems   Constitutional: Positive for chills and fever.   HENT: Positive for ear pain and sore throat. Negative for congestion and hearing loss.    Eyes: Negative for pain and visual disturbance.   Respiratory: Positive for shortness of breath (sternal pain worsened with allergies). Negative for cough.    Cardiovascular: Positive for chest pain. Negative for palpitations and peripheral edema.   Gastrointestinal: Positive for constipation and heartburn. Negative for abdominal pain, diarrhea, hematochezia and nausea.   Breasts:  Negative for tenderness, breast mass and discharge.   Genitourinary: Negative for dysuria, frequency, genital sores, hematuria, pelvic pain, urgency, vaginal bleeding and vaginal discharge.   Musculoskeletal: Negative for arthralgias, joint swelling and myalgias.   Skin: Negative for rash.   Neurological: Positive for dizziness and headaches. Negative for weakness and paresthesias.   Psychiatric/Behavioral: Negative  "for mood changes. The patient is not nervous/anxious.      Reflux is new-no taking anything currently but has medication at home     OBJECTIVE:   /72 (Cuff Size: Adult Large)   Pulse 78   Temp 97.6  F (36.4  C) (Tympanic)   Resp 16   Ht 1.702 m (5' 7\")   Wt 98.4 kg (217 lb)   SpO2 98%   BMI 33.99 kg/m    Physical Exam  GENERAL: healthy, alert and no distress  EYES: Eyes grossly normal to inspection, PERRL and conjunctivae and sclerae normal  HENT: ear canals and TM's normal, nose and mouth without ulcers or lesions  NECK: no adenopathy, no asymmetry, masses, or scars and thyroid normal to palpation  RESP: lungs clear to auscultation - no rales, rhonchi or wheezes  CV: regular rate and rhythm, normal S1 S2, no S3 or S4, no murmur, click or rub, no peripheral edema and peripheral pulses strong  ABDOMEN: soft, nontender, no hepatosplenomegaly, no masses and bowel sounds normal  MS: no gross musculoskeletal defects noted, no edema  SKIN: no suspicious lesions or rashes  NEURO: Normal strength and tone, mentation intact and speech normal  PSYCH: mentation appears normal, affect normal/bright    Diagnostic Test Results:  Results for orders placed or performed in visit on 06/06/23   HCG Qual, Urine ([id number])     Status: Normal   Result Value Ref Range    hCG Urine Qualitative Negative Negative   CBC with platelets     Status: Normal   Result Value Ref Range    WBC Count 5.7 4.0 - 11.0 10e3/uL    RBC Count 4.13 3.80 - 5.20 10e6/uL    Hemoglobin 13.1 11.7 - 15.7 g/dL    Hematocrit 39.8 35.0 - 47.0 %    MCV 96 78 - 100 fL    MCH 31.7 26.5 - 33.0 pg    MCHC 32.9 31.5 - 36.5 g/dL    RDW 12.4 10.0 - 15.0 %    Platelet Count 240 150 - 450 10e3/uL   Streptococcus A Rapid Screen w/Reflex to PCR - Clinic Collect     Status: Normal    Specimen: Throat; Swab   Result Value Ref Range    Group A Strep antigen Negative Negative       ASSESSMENT/PLAN:   (Z00.00) Routine general medical examination at a health care facility  " (primary encounter diagnosis)    (Z30.42) Encounter for surveillance of injectable contraceptive  Comment: feels really good on the Depo but struggling with weight gain, labs pending to further evaluate.  Wanting to stay on Depo at this time  Plan: HCG Qual, Urine ([id number]), medroxyPROGESTERone         (DEPO-PROVERA) injection 150 mg, CANCELED: HCG         Qual, Urine ([id number])          (R07.0) Throat pain  Comment: Rapid strep completed today to further evaluate which was negative, PCR pending.  Symptomatic care and follow up discussed  Plan: Streptococcus A Rapid Screen w/Reflex to PCR -         Clinic Collect, Group A Streptococcus PCR         Throat Swab          (R53.83) Fatigue, unspecified type  Comment: Labs pending to further evaluate  Plan: TSH with free T4 reflex, CBC with platelets,         Vitamin D Deficiency, Comprehensive metabolic         panel (BMP + Alb, Alk Phos, ALT, AST, Total.         Bili, TP)          (F32.0) Mild major depression (H)  Comment: PHQ-9 completed and reviewed.  Worsened off Depo which is helped stabilize moods, restarted today.    (Z13.220) Lipid screening  Comment: Labs pending  Plan: Lipid panel reflex to direct LDL Fasting          (Z11.4) Screening for HIV (human immunodeficiency virus)  Comment:   Plan: HIV Antigen Antibody Combo          (Z11.59) Need for hepatitis C screening test  Comment:   Plan: Hepatitis C Screen Reflex to HCV RNA Quant and         Genotype           COUNSELING:  Reviewed preventive health counseling, as reflected in patient instructions        She reports that she has never smoked. She has never used smokeless tobacco.      LUISA Hernandez [facility]  Answers for HPI/ROS submitted by the patient on 6/5/2023  If you checked off any problems, how difficult have these problems made it for you to do your work, take care of things at home, or get along with other people?: Somewhat difficult  PHQ9 TOTAL SCORE: 8

## 2023-06-06 NOTE — NURSING NOTE

## 2023-08-16 ENCOUNTER — NURSE TRIAGE (OUTPATIENT)
Dept: NURSING | Facility: CLINIC | Age: 28
End: 2023-08-16
Payer: COMMERCIAL

## 2023-08-16 NOTE — TELEPHONE ENCOUNTER
"Situation: Stress fracture Injury through July 23- July 30    Assessment:   Pt reports it happened while being on it too much, and it is the top of her left foot. Pain level is about \"6\". Pt denies swelling unless she is on it all day long. Pt is able to walk, and it hurts more to walk when she is barefoot. Pt has tried ice, but it has not helped very much.     USE A COLD PACK FOR PAIN, SWELLING, OR BRUISING: * Put a cold pack or an ice bag (wrapped in a moist towel) on the area for 20 minutes. * Repeat in 1 hour, then every 4 hours while awake. * Continue this for the first 48 hours (2 days) after an injury. * This will help decrease pain, swelling, and bruising. * Caution: Avoid frostbite.   Protocol Recommended Disposition:   See PCP With     Recommendation:   Per protocol, pt should be evaluated within 3 days in the clinic. Pt was transferred to [facility] to make an appointment. Pt was advised to go to  if she is unable to get an appointment within 3 days.     Pt was advised to:  CALL BACK IF: * Pain becomes severe * Pain does not improve after 3 days * Pain or swelling lasts more than 2 weeks * You become worse     Pt verbalized understanding.    Michael Pace RN on 8/16/2023 at 12:31 PM    Reason for Disposition   Injury and pain has not improved after 3 days    Additional Information   Negative: Major bleeding (actively dripping or spurting) that can't be stopped   Negative: Amputation or bone sticking through the skin   Negative: Looks like a dislocated joint (crooked or deformed)   Negative: Serious injury with multiple fractures (broken bones)   Negative: Sounds like a life-threatening emergency to the triager   Negative: Wound looks infected   Negative: Caused by an animal bite   Negative: Puncture wound of foot   Negative: Toe injury is main symptom   Negative: Cast problems or questions   Negative: Bullet, stabbed by knife or other serious penetrating wound   Negative: Can't stand (bear weight) " or walk (e.g., 4 steps)   Negative: Skin is split open or gaping (length > 1/2 inch or 12 mm)   Negative: Bleeding won't stop after 10 minutes of direct pressure (using correct technique)   Negative: Dirt in the wound and not removed after 15 minutes of scrubbing   Negative: Numbness (new loss of sensation) of toe(s)   Negative: Looks infected (e.g., spreading redness, red streak, pus)   Negative: Sounds like a serious injury to the triager   Negative: SEVERE pain (e.g., excruciating)   Negative: A 'snap' or 'pop' was heard at the time of injury   Negative: Large swelling or bruise and size > palm of person's hand   Negative: No prior tetanus shots (or is not fully vaccinated) and any wound (e.g., cut or scrape)   Negative: HIV positive or severe immunodeficiency (severely weak immune system) and DIRTY cut   Negative: Patient wants to be seen   Negative: Limp when walking   Negative: Has diabetes (diabetes mellitus) and any bruising or wound   Negative: High-risk adult (e.g., age > 60 years, osteoporosis, chronic steroid use)   Negative: Suspicious history for the injury   Negative: Last tetanus shot > 5 years ago and DIRTY cut or scrape   Negative: Last tetanus shot > 10 years ago and CLEAN cut or scrape    Protocols used: Ankle and Foot Injury-A-[place]

## 2023-08-17 ENCOUNTER — TRANSFERRED RECORDS (OUTPATIENT)
Dept: HEALTH INFORMATION MANAGEMENT | Facility: CLINIC | Age: 28
End: 2023-08-17
Payer: COMMERCIAL

## 2023-10-19 ENCOUNTER — ALLIED HEALTH/NURSE VISIT (OUTPATIENT)
Dept: FAMILY MEDICINE | Facility: CLINIC | Age: 28
End: 2023-10-19
Payer: COMMERCIAL

## 2023-10-19 VITALS — DIASTOLIC BLOOD PRESSURE: 78 MMHG | WEIGHT: 220 LBS | SYSTOLIC BLOOD PRESSURE: 104 MMHG | BODY MASS INDEX: 34.46 KG/M2

## 2023-10-19 DIAGNOSIS — Z30.42 ENCOUNTER FOR SURVEILLANCE OF INJECTABLE CONTRACEPTIVE: Primary | ICD-10-CM

## 2023-10-19 LAB — HCG UR QL: NEGATIVE

## 2023-10-19 PROCEDURE — 96372 THER/PROPH/DIAG INJ SC/IM: CPT | Performed by: NURSE PRACTITIONER

## 2023-10-19 PROCEDURE — 99207 PR NO CHARGE NURSE ONLY: CPT

## 2023-10-19 PROCEDURE — 81025 URINE PREGNANCY TEST: CPT

## 2023-10-19 RX ADMIN — MEDROXYPROGESTERONE ACETATE 150 MG: 150 INJECTION, SUSPENSION INTRAMUSCULAR at 13:32

## 2023-10-19 NOTE — PROGRESS NOTES
Clinic Administered Medication Documentation      Depo Provera Documentation    Depo-Provera Standing Order inclusion/exclusion criteria reviewed.     Is this the initial or subsequent dose of Depo Provera? Subsequent dose - patient is not within the acceptable window of time (11-15 weeks) for subsequent injection. Pregnancy test is indicated. Pregnancy test result: negative       Patient meets: inclusion criteria     Is there an active order (written within the past 365 days, with administrations remaining, not ) in the chart? Yes.     Prior to injection, verified patient identity using patient's name and date of birth. Medication was administered. Please see MAR and medication order for additional information.     Vial/Syringe: Single dose vial. Was entire vial of medication used? Yes    Patient instructed to remain in clinic for 15 minutes and report any adverse reaction to staff immediately.  NEXT INJECTION DUE: 24 - 24    Verified that the patient has refills remaining in their prescription.

## 2024-02-27 ENCOUNTER — ALLIED HEALTH/NURSE VISIT (OUTPATIENT)
Dept: FAMILY MEDICINE | Facility: CLINIC | Age: 29
End: 2024-02-27
Payer: COMMERCIAL

## 2024-02-27 DIAGNOSIS — Z30.42 ENCOUNTER FOR DEPO-PROVERA CONTRACEPTION: Primary | ICD-10-CM

## 2024-02-27 LAB — HCG UR QL: NEGATIVE

## 2024-02-27 PROCEDURE — 99207 PR NO CHARGE NURSE ONLY: CPT

## 2024-02-27 PROCEDURE — 81025 URINE PREGNANCY TEST: CPT

## 2024-02-27 PROCEDURE — 96372 THER/PROPH/DIAG INJ SC/IM: CPT | Performed by: NURSE PRACTITIONER

## 2024-02-27 RX ADMIN — MEDROXYPROGESTERONE ACETATE 150 MG: 150 INJECTION, SUSPENSION INTRAMUSCULAR at 12:02

## 2024-02-27 NOTE — PROGRESS NOTES
Clinic Administered Medication Documentation      Depo Provera Documentation    Depo-Provera Standing Order inclusion/exclusion criteria reviewed.     Is this the initial or subsequent dose of Depo Provera? Subsequent dose - patient is not within the acceptable window of time (11-15 weeks) for subsequent injection. Pregnancy test is indicated. Pregnancy test result: negative       Patient meets: inclusion criteria     Is there an active order (written within the past 365 days, with administrations remaining, not ) in the chart? Yes.     Prior to injection, verified patient identity using patient's name and date of birth. Medication was administered. Please see MAR and medication order for additional information.     Vial/Syringe: Single dose vial. Was entire vial of medication used? Yes    Patient instructed to remain in clinic for 15 minutes and report any adverse reaction to staff immediately.  NEXT INJECTION DUE: 24 - 24    Verified that the patient has 1 refill remaining in their prescription.      MEDICATION:  Depo Provera   ROUTE: IM  SITE: Ventrogluteal - Left  DOSE: 150 mg  LOT #: [id number]  : [employer]  EXPIRATION DATE: 2025  NDC#: 92774-478-23       Asim Erazo LPN   [place], [place]  Adult/Pediatric Family Practice     24 at 11:56 AM

## 2024-07-02 ENCOUNTER — ALLIED HEALTH/NURSE VISIT (OUTPATIENT)
Dept: FAMILY MEDICINE | Facility: CLINIC | Age: 29
End: 2024-07-02
Payer: COMMERCIAL

## 2024-07-02 DIAGNOSIS — Z30.42 ENCOUNTER FOR DEPO-PROVERA CONTRACEPTION: Primary | ICD-10-CM

## 2024-07-02 LAB — HCG UR QL: NEGATIVE

## 2024-07-02 PROCEDURE — 81025 URINE PREGNANCY TEST: CPT

## 2024-07-02 PROCEDURE — 96372 THER/PROPH/DIAG INJ SC/IM: CPT | Performed by: NURSE PRACTITIONER

## 2024-07-02 PROCEDURE — 99207 PR NO CHARGE NURSE ONLY: CPT

## 2024-07-02 RX ORDER — MEDROXYPROGESTERONE ACETATE 150 MG/ML
150 INJECTION, SUSPENSION INTRAMUSCULAR
Status: COMPLETED | OUTPATIENT
Start: 2024-07-02 | End: 2024-07-02

## 2024-07-02 RX ADMIN — MEDROXYPROGESTERONE ACETATE 150 MG: 150 INJECTION, SUSPENSION INTRAMUSCULAR at 10:54

## 2024-07-02 NOTE — PROGRESS NOTES
Clinic Administered Medication Documentation      Depo Provera Documentation    Depo-Provera Standing Order inclusion/exclusion criteria reviewed.     Is this the initial or subsequent dose of Depo Provera? Subsequent dose - patient is within the acceptable window of time (11-15 weeks) for subsequent injection. Pregnancy test not indicated.    Patient meets: inclusion criteria     Is there an active order (written within the past 365 days, with administrations remaining, not ) in the chart? Yes.     Prior to injection, verified patient identity using patient's name and date of birth. Medication was administered. Please see MAR and medication order for additional information.     Vial/Syringe: Single dose vial. Was entire vial of medication used? Yes    Patient instructed to remain in clinic for 15 minutes and report any adverse reaction to staff immediately.  NEXT INJECTION DUE: 24 - 10/15/24    Patient has no refills remaining. Refill encounter opened, order pended and Routed to the provider      Informed DUE for Appointment,     One time given today

## 2024-07-09 ENCOUNTER — OFFICE VISIT (OUTPATIENT)
Dept: PODIATRY | Facility: CLINIC | Age: 29
End: 2024-07-09
Payer: COMMERCIAL

## 2024-07-09 VITALS
SYSTOLIC BLOOD PRESSURE: 110 MMHG | BODY MASS INDEX: 33.27 KG/M2 | TEMPERATURE: 97.4 F | HEIGHT: 67 IN | DIASTOLIC BLOOD PRESSURE: 82 MMHG | WEIGHT: 212 LBS

## 2024-07-09 DIAGNOSIS — L60.0 INGROWING NAIL: Primary | ICD-10-CM

## 2024-07-09 PROCEDURE — 99203 OFFICE O/P NEW LOW 30 MIN: CPT | Mod: 25 | Performed by: PODIATRIST

## 2024-07-09 PROCEDURE — 11750 EXCISION NAIL&NAIL MATRIX: CPT | Mod: TA | Performed by: PODIATRIST

## 2024-07-09 PROCEDURE — 11750 EXCISION NAIL&NAIL MATRIX: CPT | Mod: T5 | Performed by: PODIATRIST

## 2024-07-09 ASSESSMENT — PAIN SCALES - GENERAL: PAINLEVEL: NO PAIN (0)

## 2024-07-09 NOTE — LETTER
7/9/2024      Leah Marie Schoenecker  [address]  [facility] [address]      Dear Colleague,    Thank you for referring your patient, Leah Marie Schoenecker, to [facility]. Please see a copy of my visit note below.    HPI:  Leah Marie Schoenecker is a 29 year old female who is seen in consultation at the request of self    Pt presents for eval of:   (Onset, Location, L/R, Character, Treatments, Injury if yes)     Onset May 2024, ingrown medial Left > Right great toenails.  Constant redness, swelling. Sharp and throbbing pain with pressure.    Trimming toenail border provides temporary relief    Works at [employer] , on her feet for 8-10 hour work day.    Review of Systems:  Patient denies fever, chills, rash, wound, stiffness, limping, numbness, weakness, heart burn, blood in stool, chest pain with activity, calf pain when walking, shortness of breath with activity, chronic cough, easy bleeding/bruising, swelling of ankles, excessive thirst, fatigue, depression, anxiety.  Pt admits to the symptoms noted in history above.     PAST MEDICAL HISTORY:   Past Medical History:   Diagnosis Date     Depressive disorder      Uncomplicated asthma     Very young age        PAST SURGICAL HISTORY:   Past Surgical History:   Procedure Laterality Date     COLONOSCOPY N/A 6/10/2015    Procedure: COLONOSCOPY;  Surgeon: Jose Leroy MD;  Location: [facility]     NO HISTORY OF SURGERY          MEDICATIONS:   Current Outpatient Medications:      albuterol (PROAIR HFA/PROVENTIL HFA/VENTOLIN HFA) 108 (90 Base) MCG/ACT inhaler, Inhale 2 puffs into the lungs every 6 hours as needed for shortness of breath / dyspnea or wheezing (Patient not taking: Reported on 2/27/2024), Disp: 6.7 g, Rfl: 0     ibuprofen (ADVIL/MOTRIN) 200 MG capsule, Take 200 mg by mouth every 4 hours as needed for fever (Patient not taking: Reported on 2/27/2024), Disp: , Rfl:      ALLERGIES:   "  Allergies   Allergen Reactions     Pineapple Hives and Difficulty breathing        SOCIAL HISTORY:   Social History     Socioeconomic History     Marital status: Single     Spouse name: Not on file     Number of children: Not on file     Years of education: Not on file     Highest education level: Not on file   Occupational History     Not on file   Tobacco Use     Smoking status: Never     Smokeless tobacco: Never   Vaping Use     Vaping status: Never Used   Substance and Sexual Activity     Alcohol use: Not Currently     Comment: monthly at most     Drug use: No     Sexual activity: Not Currently     Partners: Male     Birth control/protection: None   Other Topics Concern     Parent/sibling w/ CABG, MI or angioplasty before 65F 55M? No   Social History Narrative     Not on file     Social Determinants of Health     Financial Resource Strain: Not on file   Food Insecurity: Not on file   Transportation Needs: Not on file   Physical Activity: Not on file   Stress: Not on file   Social Connections: Not on file   Interpersonal Safety: Not on file   Housing Stability: Not on file        FAMILY HISTORY:   Family History   Problem Relation Age of Onset     Hypertension Maternal Grandmother      Diabetes Paternal Grandfather      Heart Disease Paternal Grandfather      Bladder Cancer Paternal Grandfather      Other Cancer Paternal Grandfather         Bladder     Other - See Comments Sister         endometrises      Vision Loss Sister      Hypertension Mother      Hyperlipidemia Mother      Other - See Comments Sister         endometrioses      Thyroid Disease Maternal Aunt      Breast Cancer Other         EXAM:Vitals: /82 (BP Location: Left arm, Patient Position: Sitting, Cuff Size: Adult Large)   Temp 97.4  F (36.3  C) (Temporal)   Ht 1.702 m (5' 7\")   Wt 96.2 kg (212 lb)   BMI 33.20 kg/m    BMI= Body mass index is 33.2 kg/m .    General appearance: Patient is alert and fully cooperative with history & exam.  " No sign of distress is noted during the visit.     Psychiatric: Affect is pleasant & appropriate.  Patient appears motivated to improve health.     Respiratory: Breathing is regular & unlabored while sitting.     HEENT: Hearing is intact to spoken word.  Speech is clear.  No gross evidence of visual impairment that would impact ambulation.     Vascular: DP & PT pulses are intact & regular, CFT immediate, positive digital hair growth bilaterally.  No significant edema or varicosities noted and skin temperature is normal to both lower extremities.     Neurologic: Lower extremity sensation is intact to light touch.  No evidence of weakness or contracture in the lower extremities.  No evidence of neuropathy.    Dermatologic: Adequate texture, turgor and tone about the integument.  No discoloration or thickening of the toenail however the right medial and left medial hallux nail border(s) are ingrown with localized erythema, discomfort and purulent drainage.     Musculoskeletal: Patient is ambulatory without assistive device or brace.  No gross ankle deformity noted.  No foot or ankle joint effusion is noted.       ASSESSMENT:       ICD-10-CM    1. Ingrowing nail  L60.0 REMOVAL NAIL/NAIL BED, PARTIAL OR COMPLETE          Plan:   7/9/2024  We reviewed medical history and EPIC chart.  After obtaining informed consent to permanently remove the right medial and left medial hallux nail(s), I utilized 3 cc of lidocaine plain to achieve local anesthesia per digit.  The toenails were then prepped with Betadine in usual fashion.  A quarter inch Penrose drain was then utilized for hemostasis.  100% of the toenail border was avulsed utilizing a nail elevator, english anvil, 6100 blade and hemostat.  The proximal root portion of the nail was confirmed to be removed atraumatically.  Three applications of 89% phenol were applied to the surgical site for 30 seconds each followed by a curette after each application.  Surgical site was  then flushed with alcohol and dressed with bacitracin and a nonadherent compression dressing.  Tourniquet was removed after approximately 3 minutes followed by immediate hyperemia to the distal aspect of the hallux.  Written postoperative instructions were dispensed and patient instructed to follow up in 1-2 weeks with any questions, pain,drainage, delayed healing or concerns.  I answered all questions to patients satisfaction.     If patient calls in the next 1-3 weeks with continued redness, pain or drainage I would recommend beginning oral Keflex 500 mg, 4 times a day ×10 days, after confirming there is no allergy.        Avery Medina DPM      Again, thank you for allowing me to participate in the care of your patient.        Sincerely,        Avery Medina DPM

## 2024-07-09 NOTE — PROGRESS NOTES
HPI:  Leah Marie Schoenecker is a 29 year old female who is seen in consultation at the request of self    Pt presents for eval of:   (Onset, Location, L/R, Character, Treatments, Injury if yes)     Onset May 2024, ingrown medial Left > Right great toenails.  Constant redness, swelling. Sharp and throbbing pain with pressure.    Trimming toenail border provides temporary relief    Works at [employer], [employer] , on her feet for 8-10 hour work day.    Review of Systems:  Patient denies fever, chills, rash, wound, stiffness, limping, numbness, weakness, heart burn, blood in stool, chest pain with activity, calf pain when walking, shortness of breath with activity, chronic cough, easy bleeding/bruising, swelling of ankles, excessive thirst, fatigue, depression, anxiety.  Pt admits to the symptoms noted in history above.     PAST MEDICAL HISTORY:   Past Medical History:   Diagnosis Date    Depressive disorder     Uncomplicated asthma     Very young age        PAST SURGICAL HISTORY:   Past Surgical History:   Procedure Laterality Date    COLONOSCOPY N/A 6/10/2015    Procedure: COLONOSCOPY;  Surgeon: Jose Leroy MD;  Location: [facility]    NO HISTORY OF SURGERY          MEDICATIONS:   Current Outpatient Medications:     albuterol (PROAIR HFA/PROVENTIL HFA/VENTOLIN HFA) 108 (90 Base) MCG/ACT inhaler, Inhale 2 puffs into the lungs every 6 hours as needed for shortness of breath / dyspnea or wheezing (Patient not taking: Reported on 2/27/2024), Disp: 6.7 g, Rfl: 0    ibuprofen (ADVIL/MOTRIN) 200 MG capsule, Take 200 mg by mouth every 4 hours as needed for fever (Patient not taking: Reported on 2/27/2024), Disp: , Rfl:      ALLERGIES:    Allergies   Allergen Reactions    Pineapple Hives and Difficulty breathing        SOCIAL HISTORY:   Social History     Socioeconomic History    Marital status: Single     Spouse name: Not on file    Number of children: Not on file    Years of  "education: Not on file    Highest education level: Not on file   Occupational History    Not on file   Tobacco Use    Smoking status: Never    Smokeless tobacco: Never   Vaping Use    Vaping status: Never Used   Substance and Sexual Activity    Alcohol use: Not Currently     Comment: monthly at most    Drug use: No    Sexual activity: Not Currently     Partners: Male     Birth control/protection: None   Other Topics Concern    Parent/sibling w/ CABG, MI or angioplasty before 65F 55M? No   Social History Narrative    Not on file     Social Determinants of Health     Financial Resource Strain: Not on file   Food Insecurity: Not on file   Transportation Needs: Not on file   Physical Activity: Not on file   Stress: Not on file   Social Connections: Not on file   Interpersonal Safety: Not on file   Housing Stability: Not on file        FAMILY HISTORY:   Family History   Problem Relation Age of Onset    Hypertension Maternal Grandmother     Diabetes Paternal Grandfather     Heart Disease Paternal Grandfather     Bladder Cancer Paternal Grandfather     Other Cancer Paternal Grandfather         Bladder    Other - See Comments Sister         endometrises     Vision Loss Sister     Hypertension Mother     Hyperlipidemia Mother     Other - See Comments Sister         endometrioses     Thyroid Disease Maternal Aunt     Breast Cancer Other         EXAM:Vitals: /82 (BP Location: Left arm, Patient Position: Sitting, Cuff Size: Adult Large)   Temp 97.4  F (36.3  C) (Temporal)   Ht 1.702 m (5' 7\")   Wt 96.2 kg (212 lb)   BMI 33.20 kg/m    BMI= Body mass index is 33.2 kg/m .    General appearance: Patient is alert and fully cooperative with history & exam.  No sign of distress is noted during the visit.     Psychiatric: Affect is pleasant & appropriate.  Patient appears motivated to improve health.     Respiratory: Breathing is regular & unlabored while sitting.     HEENT: Hearing is intact to spoken word.  Speech is clear. "  No gross evidence of visual impairment that would impact ambulation.     Vascular: DP & PT pulses are intact & regular, CFT immediate, positive digital hair growth bilaterally.  No significant edema or varicosities noted and skin temperature is normal to both lower extremities.     Neurologic: Lower extremity sensation is intact to light touch.  No evidence of weakness or contracture in the lower extremities.  No evidence of neuropathy.    Dermatologic: Adequate texture, turgor and tone about the integument.  No discoloration or thickening of the toenail however the right medial and left medial hallux nail border(s) are ingrown with localized erythema, discomfort and purulent drainage.     Musculoskeletal: Patient is ambulatory without assistive device or brace.  No gross ankle deformity noted.  No foot or ankle joint effusion is noted.       ASSESSMENT:       ICD-10-CM    1. Ingrowing nail  L60.0 REMOVAL NAIL/NAIL BED, PARTIAL OR COMPLETE          Plan:   7/9/2024  We reviewed medical history and EPIC chart.  After obtaining informed consent to permanently remove the right medial and left medial hallux nail(s), I utilized 3 cc of lidocaine plain to achieve local anesthesia per digit.  The toenails were then prepped with Betadine in usual fashion.  A quarter inch Penrose drain was then utilized for hemostasis.  100% of the toenail border was avulsed utilizing a nail elevator, english anvil, 6100 blade and hemostat.  The proximal root portion of the nail was confirmed to be removed atraumatically.  Three applications of 89% phenol were applied to the surgical site for 30 seconds each followed by a curette after each application.  Surgical site was then flushed with alcohol and dressed with bacitracin and a nonadherent compression dressing.  Tourniquet was removed after approximately 3 minutes followed by immediate hyperemia to the distal aspect of the hallux.  Written postoperative instructions were dispensed and  patient instructed to follow up in 1-2 weeks with any questions, pain,drainage, delayed healing or concerns.  I answered all questions to patients satisfaction.     If patient calls in the next 1-3 weeks with continued redness, pain or drainage I would recommend beginning oral Keflex 500 mg, 4 times a day ×10 days, after confirming there is no allergy.        Avery Medina DPM

## 2024-08-11 ENCOUNTER — HEALTH MAINTENANCE LETTER (OUTPATIENT)
Age: 29
End: 2024-08-11

## 2024-09-22 ASSESSMENT — PATIENT HEALTH QUESTIONNAIRE - PHQ9
10. IF YOU CHECKED OFF ANY PROBLEMS, HOW DIFFICULT HAVE THESE PROBLEMS MADE IT FOR YOU TO DO YOUR WORK, TAKE CARE OF THINGS AT HOME, OR GET ALONG WITH OTHER PEOPLE: VERY DIFFICULT
SUM OF ALL RESPONSES TO PHQ QUESTIONS 1-9: 11
SUM OF ALL RESPONSES TO PHQ QUESTIONS 1-9: 11

## 2024-09-23 ENCOUNTER — OFFICE VISIT (OUTPATIENT)
Dept: FAMILY MEDICINE | Facility: CLINIC | Age: 29
End: 2024-09-23
Payer: COMMERCIAL

## 2024-09-23 VITALS
SYSTOLIC BLOOD PRESSURE: 118 MMHG | DIASTOLIC BLOOD PRESSURE: 84 MMHG | TEMPERATURE: 97.5 F | WEIGHT: 208 LBS | HEART RATE: 88 BPM | OXYGEN SATURATION: 99 % | RESPIRATION RATE: 18 BRPM | HEIGHT: 67 IN | BODY MASS INDEX: 32.65 KG/M2

## 2024-09-23 DIAGNOSIS — Z13.1 SCREENING FOR DIABETES MELLITUS: ICD-10-CM

## 2024-09-23 DIAGNOSIS — Z12.4 CERVICAL CANCER SCREENING: ICD-10-CM

## 2024-09-23 DIAGNOSIS — Z30.42 ENCOUNTER FOR DEPO-PROVERA CONTRACEPTION: ICD-10-CM

## 2024-09-23 DIAGNOSIS — Z00.00 ROUTINE GENERAL MEDICAL EXAMINATION AT A HEALTH CARE FACILITY: Primary | ICD-10-CM

## 2024-09-23 DIAGNOSIS — Z13.220 LIPID SCREENING: ICD-10-CM

## 2024-09-23 LAB
CHOLEST SERPL-MCNC: 168 MG/DL
EST. AVERAGE GLUCOSE BLD GHB EST-MCNC: 103 MG/DL
FASTING STATUS PATIENT QL REPORTED: YES
FASTING STATUS PATIENT QL REPORTED: YES
GLUCOSE SERPL-MCNC: 99 MG/DL (ref 70–99)
HBA1C MFR BLD: 5.2 % (ref 0–5.6)
HDLC SERPL-MCNC: 40 MG/DL
HPV HR 12 DNA CVX QL NAA+PROBE: NEGATIVE
HPV16 DNA CVX QL NAA+PROBE: NEGATIVE
HPV18 DNA CVX QL NAA+PROBE: NEGATIVE
HUMAN PAPILLOMA VIRUS FINAL DIAGNOSIS: NORMAL
LDLC SERPL CALC-MCNC: 116 MG/DL
NONHDLC SERPL-MCNC: 128 MG/DL
TRIGL SERPL-MCNC: 62 MG/DL

## 2024-09-23 PROCEDURE — 80061 LIPID PANEL: CPT | Performed by: NURSE PRACTITIONER

## 2024-09-23 PROCEDURE — 96372 THER/PROPH/DIAG INJ SC/IM: CPT | Performed by: NURSE PRACTITIONER

## 2024-09-23 PROCEDURE — 90471 IMMUNIZATION ADMIN: CPT | Performed by: NURSE PRACTITIONER

## 2024-09-23 PROCEDURE — 87624 HPV HI-RISK TYP POOLED RSLT: CPT | Performed by: NURSE PRACTITIONER

## 2024-09-23 PROCEDURE — 36415 COLL VENOUS BLD VENIPUNCTURE: CPT | Performed by: NURSE PRACTITIONER

## 2024-09-23 PROCEDURE — G0145 SCR C/V CYTO,THINLAYER,RESCR: HCPCS | Performed by: NURSE PRACTITIONER

## 2024-09-23 PROCEDURE — 99395 PREV VISIT EST AGE 18-39: CPT | Mod: 25 | Performed by: NURSE PRACTITIONER

## 2024-09-23 PROCEDURE — 82947 ASSAY GLUCOSE BLOOD QUANT: CPT | Performed by: NURSE PRACTITIONER

## 2024-09-23 PROCEDURE — 90715 TDAP VACCINE 7 YRS/> IM: CPT | Performed by: NURSE PRACTITIONER

## 2024-09-23 PROCEDURE — 83036 HEMOGLOBIN GLYCOSYLATED A1C: CPT | Performed by: NURSE PRACTITIONER

## 2024-09-23 RX ORDER — MEDROXYPROGESTERONE ACETATE 150 MG/ML
150 INJECTION, SUSPENSION INTRAMUSCULAR
Status: ACTIVE | OUTPATIENT
Start: 2024-09-23 | End: 2025-09-18

## 2024-09-23 RX ADMIN — MEDROXYPROGESTERONE ACETATE 150 MG: 150 INJECTION, SUSPENSION INTRAMUSCULAR at 10:59

## 2024-09-23 ASSESSMENT — PAIN SCALES - GENERAL: PAINLEVEL: NO PAIN (0)

## 2024-09-23 NOTE — PROGRESS NOTES
Preventive Care Visit  [facility]  LUISA Hernandez CNP, Family Medicine  Sep 23, 2024    Assessment & Plan     Routine general medical examination at a health care facility    Cervical cancer screening    - HPV and Gynecologic Cytology Panel - Recommended Age 30 - 65 Years    Screening for diabetes mellitus  Labs pending for work   - Glucose; Future  - Hemoglobin A1c; Future  - Glucose  - Hemoglobin A1c    Lipid screening    - Lipid panel reflex to direct LDL Fasting; Future  - Lipid panel reflex to direct LDL Fasting    Encounter for Depo-Provera contraception  Depo renewed until Angie can get scheduled for either Nexplanon or IUD placement, risks and benefits of both discussed.  She will decide which when she wants to proceed with and schedule an appointment for placement.  - medroxyPROGESTERone (DEPO-PROVERA) injection 150 mg      Counseling  Appropriate preventive services were addressed with this patient via screening, questionnaire, or discussion as appropriate for fall prevention, nutrition, physical activity, Tobacco-use cessation, social engagement, weight loss and cognition.  Checklist reviewing preventive services available has been given to the patient.  Reviewed patient's diet, addressing concerns and/or questions.   The patient was instructed to see the dentist every 6 months.   The patient's PHQ-9 score is consistent with moderate depression. She was provided with information regarding depression.     Subjective   Angie is a 29 year old, presenting for the following:  Physical, Medication Request (Discuss birth control. Currently on Depo Provera, would like to discuss alternative. ), and Forms (Biometric form for work. )        9/23/2024    10:15 AM   Additional Questions   Roomed by Elayne PORTILLO   Accompanied by self         9/23/2024    10:15 AM   Patient Reported Additional Medications   Patient reports taking the following new medications no new meds        Health Care  Directive  Patient does not have a Health Care Directive or Living Will: Discussed advance care planning with patient; however, patient declined at this time.    HPI        9/22/2024   General Health   How would you rate your overall physical health? (!) FAIR   Feel stress (tense, anxious, or unable to sleep) Rather much      (!) STRESS CONCERN      9/22/2024   Nutrition   Three or more servings of calcium each day? Yes   Diet: Gluten-free/reduced   How many servings of fruit and vegetables per day? (!) 2-3   How many sweetened beverages each day? 0-1            9/22/2024   Exercise   Days per week of moderate/strenous exercise 4 days   Average minutes spent exercising at this level 40 min            9/22/2024   Social Factors   Frequency of gathering with friends or relatives Once a week   Worry food won't last until get money to buy more No   Food not last or not have enough money for food? No   Do you have housing? (Housing is defined as stable permanent housing and does not include staying ouside in a car, in a tent, in an abandoned building, in an overnight shelter, or couch-surfing.) Yes   Are you worried about losing your housing? No   Lack of transportation? No   Unable to get utilities (heat,electricity)? No            9/22/2024   Dental   Dentist two times every year? (!) NO            9/22/2024   TB Screening   Were you born outside of the [place]? No          Today's PHQ-9 Score:       9/22/2024     9:30 PM   PHQ-9 SCORE   PHQ-9 Total Score MyChart 11 (Moderate depression)   PHQ-9 Total Score 11         9/22/2024   Substance Use   Alcohol more than 3/day or more than 7/wk No   Do you use any other substances recreationally? No        Social History     Tobacco Use    Smoking status: Never    Smokeless tobacco: Never   Vaping Use    Vaping status: Never Used   Substance Use Topics    Alcohol use: Not Currently     Comment: monthly at most    Drug use: No         6/5/2023   LAST FHS-7 RESULTS   1st degree  "relative breast or ovarian cancer Yes   Any relative bilateral breast cancer No   Any male have breast cancer No   Any ONE woman have BOTH breast AND ovarian cancer Yes   Any woman with breast cancer before 50yrs No   2 or more relatives with breast AND/OR ovarian cancer Unknown   2 or more relatives with breast AND/OR bowel cancer Unknown      Mammogram Screening - Patient under 40 years of age: Routine Mammogram Screening not recommended.         9/22/2024   STI Screening   New sexual partner(s) since last STI/HIV test? No        History of abnormal Pap smear: No - age 30- 64 PAP with HPV every 5 years recommended        Latest Ref Rng & Units 1/10/2022    10:44 AM 6/3/2016    12:00 AM   PAP / HPV   PAP  Negative for Intraepithelial Lesion or Malignancy (NILM)     PAP (Historical)   NIL    HPV 16 DNA Negative Negative     HPV 18 DNA Negative Negative     Other HR HPV Negative Negative             9/22/2024   Contraception/Family Planning   Questions about contraception or family planning (!) YES       Reviewed and updated as needed this visit by Provider   Tobacco  Allergies  Meds  Problems  Med Hx  Surg Hx  Fam Hx              Review of Systems  Constitutional, HEENT, cardiovascular, pulmonary, gi and gu systems are negative, except as otherwise noted.     Objective    Exam  /84   Pulse 88   Temp 97.5  F (36.4  C) (Tympanic)   Resp 18   Ht 1.695 m (5' 6.75\")   Wt 94.3 kg (208 lb)   LMP  (LMP Unknown)   SpO2 99%   Breastfeeding Unknown   BMI 32.82 kg/m     Estimated body mass index is 32.82 kg/m  as calculated from the following:    Height as of this encounter: 1.695 m (5' 6.75\").    Weight as of this encounter: 94.3 kg (208 lb).    Physical Exam  GENERAL: alert and no distress  EYES: Eyes grossly normal to inspection, PERRL and conjunctivae and sclerae normal  HENT: ear canals and TM's normal, nose and mouth without ulcers or lesions  NECK: no adenopathy, no asymmetry, masses, or " scars  RESP: lungs clear to auscultation - no rales, rhonchi or wheezes  BREAST: normal without masses, tenderness or nipple discharge and no palpable axillary masses or adenopathy  CV: regular rate and rhythm, normal S1 S2, no S3 or S4, no murmur, click or rub, no peripheral edema  ABDOMEN: soft, nontender, no hepatosplenomegaly, no masses and bowel sounds normal   (female): normal female external genitalia, normal urethral meatus, normal vaginal mucosa  MS: no gross musculoskeletal defects noted, no edema  SKIN: no suspicious lesions or rashes  NEURO: Normal strength and tone, mentation intact and speech normal  PSYCH: mentation appears normal, affect normal/bright        Signed Electronically by: LUISA Hernandez CNP    Answers submitted by the patient for this visit:  Patient Health Questionnaire (Submitted on 9/22/2024)  If you checked off any problems, how difficult have these problems made it for you to do your work, take care of things at home, or get along with other people?: Very difficult  PHQ9 TOTAL SCORE: 11

## 2024-09-23 NOTE — PATIENT INSTRUCTIONS
Patient Education   Preventive Care Advice   This is general advice given by our system to help you stay healthy. However, your care team may have specific advice just for you. Please talk to your care team about your preventive care needs.  Nutrition  Eat 5 or more servings of fruits and vegetables each day.  Try wheat bread, brown rice and whole grain pasta (instead of white bread, rice, and pasta).  Get enough calcium and vitamin D. Check the label on foods and aim for 100% of the RDA (recommended daily allowance).  Lifestyle  Exercise at least 150 minutes each week  (30 minutes a day, 5 days a week).  Do muscle strengthening activities 2 days a week. These help control your weight and prevent disease.  No smoking.  Wear sunscreen to prevent skin cancer.  Have a dental exam and cleaning every 6 months.  Yearly exams  See your health care team every year to talk about:  Any changes in your health.  Any medicines your care team has prescribed.  Preventive care, family planning, and ways to prevent chronic diseases.  Shots (vaccines)   HPV shots (up to age 26), if you've never had them before.  Hepatitis B shots (up to age 59), if you've never had them before.  COVID-19 shot: Get this shot when it's due.  Flu shot: Get a flu shot every year.  Tetanus shot: Get a tetanus shot every 10 years.  Pneumococcal, hepatitis A, and RSV shots: Ask your care team if you need these based on your risk.  Shingles shot (for age 50 and up)  General health tests  Diabetes screening:  Starting at age 35, Get screened for diabetes at least every 3 years.  If you are younger than age 35, ask your care team if you should be screened for diabetes.  Cholesterol test: At age 39, start having a cholesterol test every 5 years, or more often if advised.  Bone density scan (DEXA): At age 50, ask your care team if you should have this scan for osteoporosis (brittle bones).  Hepatitis C: Get tested at least once in your life.  STIs (sexually  transmitted infections)  Before age 24: Ask your care team if you should be screened for STIs.  After age 24: Get screened for STIs if you're at risk. You are at risk for STIs (including HIV) if:  You are sexually active with more than one person.  You don't use condoms every time.  You or a partner was diagnosed with a sexually transmitted infection.  If you are at risk for HIV, ask about PrEP medicine to prevent HIV.  Get tested for HIV at least once in your life, whether you are at risk for HIV or not.  Cancer screening tests  Cervical cancer screening: If you have a cervix, begin getting regular cervical cancer screening tests starting at age 21.  Breast cancer scan (mammogram): If you've ever had breasts, begin having regular mammograms starting at age 40. This is a scan to check for breast cancer.  Colon cancer screening: It is important to start screening for colon cancer at age 45.  Have a colonoscopy test every 10 years (or more often if you're at risk) Or, ask your provider about stool tests like a FIT test every year or Cologuard test every 3 years.  To learn more about your testing options, visit:   .  For help making a decision, visit:   https://bit.ly/rf69695.  Prostate cancer screening test: If you have a prostate, ask your care team if a prostate cancer screening test (PSA) at age 55 is right for you.  Lung cancer screening: If you are a current or former smoker ages 50 to 80, ask your care team if ongoing lung cancer screenings are right for you.  For informational purposes only. Not to replace the advice of your health care provider. Copyright   2023 [facility]. All rights reserved. Clinically reviewed by the [facility] Transitions Program. [employer] [id number] - REV 01/24.  Learning About Stress  What is stress?     Stress is your body's response to a hard situation. Your body can have a physical, emotional, or mental response. Stress is a fact of life for most people, and it  affects everyone differently. What causes stress for you may not be stressful for someone else.  A lot of things can cause stress. You may feel stress when you go on a job interview, take a test, or run a race. This kind of short-term stress is normal and even useful. It can help you if you need to work hard or react quickly. For example, stress can help you finish an important job on time.  Long-term stress is caused by ongoing stressful situations or events. Examples of long-term stress include long-term health problems, ongoing problems at work, or conflicts in your family. Long-term stress can harm your health.  How does stress affect your health?  When you are stressed, your body responds as though you are in danger. It makes hormones that speed up your heart, make you breathe faster, and give you a burst of energy. This is called the fight-or-flight stress response. If the stress is over quickly, your body goes back to normal and no harm is done.  But if stress happens too often or lasts too long, it can have bad effects. Long-term stress can make you more likely to get sick, and it can make symptoms of some diseases worse. If you tense up when you are stressed, you may develop neck, shoulder, or low back pain. Stress is linked to high blood pressure and heart disease.  Stress also harms your emotional health. It can make you vuong, tense, or depressed. Your relationships may suffer, and you may not do well at work or school.  What can you do to manage stress?  You can try these things to help manage stress:   Do something active. Exercise or activity can help reduce stress. Walking is a great way to get started. Even everyday activities such as housecleaning or yard work can help.  Try yoga or maico chi. These techniques combine exercise and meditation. You may need some training at first to learn them.  Do something you enjoy. For example, listen to music or go to a movie. Practice your hobby or do volunteer  "work.  Meditate. This can help you relax, because you are not worrying about what happened before or what may happen in the future.  Do guided imagery. Imagine yourself in any setting that helps you feel calm. You can use online videos, books, or a teacher to guide you.  Do breathing exercises. For example:  From a standing position, bend forward from the waist with your knees slightly bent. Let your arms dangle close to the floor.  Breathe in slowly and deeply as you return to a standing position. Roll up slowly and lift your head last.  Hold your breath for just a few seconds in the standing position.  Breathe out slowly and bend forward from the waist.  Let your feelings out. Talk, laugh, cry, and express anger when you need to. Talking with supportive friends or family, a counselor, or a tali leader about your feelings is a healthy way to relieve stress. Avoid discussing your feelings with people who make you feel worse.  Write. It may help to write about things that are bothering you. This helps you find out how much stress you feel and what is causing it. When you know this, you can find better ways to cope.  What can you do to prevent stress?  You might try some of these things to help prevent stress:  Manage your time. This helps you find time to do the things you want and need to do.  Get enough sleep. Your body recovers from the stresses of the day while you are sleeping.  Get support. Your family, friends, and community can make a difference in how you experience stress.  Limit your news feed. Avoid or limit time on social media or news that may make you feel stressed.  Do something active. Exercise or activity can help reduce stress. Walking is a great way to get started.  Where can you learn more?  Go to https://www.healthwise.net/patiented  Enter N032 in the search box to learn more about \"Learning About Stress.\"  Current as of: October 24, 2023               Content Version: 14.0    6972-4598 " [employer].   Care instructions adapted under license by your healthcare professional. If you have questions about a medical condition or this instruction, always ask your healthcare professional. [employer] disclaims any warranty or liability for your use of this information.      Learning About Depression Screening  What is depression screening?  Depression screening is a way to see if you have depression symptoms. It may be done by a doctor or counselor. It's often part of a routine checkup. That's because your mental health is just as important as your physical health.  Depression is a mental health condition that affects how you feel, think, and act. You may:  Have less energy.  Lose interest in your daily activities.  Feel sad and grouchy for a long time.  Depression is very common. It affects people of all ages.  Many things can lead to depression. Some people become depressed after they have a stroke or find out they have a major illness like cancer or heart disease. The death of a loved one or a breakup may lead to depression. It can run in families. Most experts believe that a combination of inherited genes and stressful life events can cause it.  What happens during screening?  You may be asked to fill out a form about your depression symptoms. You and the doctor will discuss your answers. The doctor may ask you more questions to learn more about how you think, act, and feel.  What happens after screening?  If you have symptoms of depression, your doctor will talk to you about your options.  Doctors usually treat depression with medicines or counseling. Often, combining the two works best. Many people don't get help because they think that they'll get over the depression on their own. But people with depression may not get better unless they get treatment.  The cause of depression is not well understood. There may be many factors involved. But if you have depression, it's not  "your fault.  A serious symptom of depression is thinking about death or suicide. If you or someone you care about talks about this or about feeling hopeless, get help right away.  It's important to know that depression can be treated. Medicine, counseling, and self-care may help.  Where can you learn more?  Go to https://www.[employer].net/patiented  Enter T185 in the search box to learn more about \"Learning About Depression Screening.\"  Current as of: June 24, 2023  Content Version: 14.1 2006-2024 [employer].   Care instructions adapted under license by your healthcare professional. If you have questions about a medical condition or this instruction, always ask your healthcare professional. [employer] disclaims any warranty or liability for your use of this information.       "

## 2024-09-25 LAB
BKR AP ASSOCIATED HPV REPORT: NORMAL
BKR LAB AP GYN ADEQUACY: NORMAL
BKR LAB AP GYN INTERPRETATION: NORMAL
BKR LAB AP PREVIOUS ABNORMAL: NORMAL
PATH REPORT.COMMENTS IMP SPEC: NORMAL
PATH REPORT.COMMENTS IMP SPEC: NORMAL
PATH REPORT.RELEVANT HX SPEC: NORMAL

## 2024-11-18 ENCOUNTER — E-VISIT (OUTPATIENT)
Dept: FAMILY MEDICINE | Facility: CLINIC | Age: 29
End: 2024-11-18
Payer: COMMERCIAL

## 2024-11-18 DIAGNOSIS — J01.90 ACUTE SINUSITIS WITH SYMPTOMS > 10 DAYS: Primary | ICD-10-CM

## 2024-11-18 NOTE — PATIENT INSTRUCTIONS
Acute Sinusitis: Care Instructions  Overview     Acute sinusitis is an inflammation of the mucous membranes inside the nose and sinuses. Sinuses are the hollow spaces in your skull around the eyes and nose. Acute sinusitis often follows a cold. Acute sinusitis causes thick, discolored mucus that drains from the nose or down the back of the throat. It also can cause pain and pressure in your head and face along with a stuffy or blocked nose.  In most cases, sinusitis gets better on its own in 1 to 2 weeks. But some mild symptoms may last for several weeks. Sometimes antibiotics are needed if there is a bacterial infection.  Follow-up care is a key part of your treatment and safety. Be sure to make and go to all appointments, and call your doctor if you are having problems. It's also a good idea to know your test results and keep a list of the medicines you take.  How can you care for yourself at home?  Use saline (saltwater) nasal washes. This can help keep your nasal passages open and wash out mucus and allergens.  You can buy saline nose washes at a grocery store or drugstore. Follow the instructions on the package.  You can make your own at home. Add 1 teaspoon of non-iodized salt and 1 teaspoon of baking soda to 2 cups of distilled or boiled and cooled water. Fill a squeeze bottle or a nasal cleansing pot (such as a neti pot) with the nasal wash. Then put the tip into your nostril, and lean over the sink. With your mouth open, gently squirt the liquid. Repeat on the other side.  Try a decongestant nasal spray like oxymetazoline (Afrin). Do not use it for more than 3 days in a row. Using it for more than 3 days can make your congestion worse.  If needed, take an over-the-counter pain medicine, such as acetaminophen (Tylenol), ibuprofen (Advil, Motrin), or naproxen (Aleve). Read and follow all instructions on the label.  If the doctor prescribed antibiotics, take them as directed. Do not stop taking them just  "because you feel better. You need to take the full course of antibiotics.  Be careful when taking over-the-counter cold or flu medicines and Tylenol at the same time. Many of these medicines have acetaminophen, which is Tylenol. Read the labels to make sure that you are not taking more than the recommended dose. Too much acetaminophen (Tylenol) can be harmful.  Try a steroid nasal spray. It may help with your symptoms.  Breathe warm, moist air. You can use a steamy shower, a hot bath, or a sink filled with hot water. Avoid cold, dry air. Using a humidifier in your home may help. Follow the directions for cleaning the machine.  When should you call for help?   Call your doctor now or seek immediate medical care if:    You have new or worse swelling, redness, or pain in your face or around one or both of your eyes.     You have double vision or a change in your vision.     You have a high fever.     You have a severe headache and a stiff neck.     You have mental changes, such as feeling confused or much less alert.   Watch closely for changes in your health, and be sure to contact your doctor if:    You are not getting better as expected.   Where can you learn more?  Go to https://www.[employer].net/patiented  Enter I933 in the search box to learn more about \"Acute Sinusitis: Care Instructions.\"  Current as of: September 27, 2023  Content Version: 14.2 2024 [facility]   Care instructions adapted under license by your healthcare professional. If you have questions about a medical condition or this instruction, always ask your healthcare professional. [employer] disclaims any warranty or liability for your use of this information.    Dear Leah Marie Schoenecker    After reviewing your responses, I've been able to diagnose you with Acute sinusitis with symptoms > 10 days.      Based on your responses and diagnosis, I have prescribed   Orders Placed This Encounter   Medications     " amoxicillin-clavulanate (AUGMENTIN) 875-125 MG tablet     Sig: Take 1 tablet by mouth 2 times daily for 7 days.     Dispense:  14 tablet     Refill:  0      to treat your symptoms. I have sent this to your pharmacy.?     It is also important to stay well hydrated, get lots of rest and take over-the-counter decongestants,?tylenol?or ibuprofen if you?are able to?take those medications per your primary care provider to help relieve discomfort.?     It is important that you take?all of?your prescribed medication even if your symptoms are improving after a few doses.? Taking?all of?your medicine helps prevent the symptoms from returning.?     If your symptoms worsen, you develop severe headache, vomiting, high fever (>102), or are not improving in 7 days, please contact your primary care provider for an appointment or visit any of our convenient Walk-in Care or Urgent Care Centers to be seen which can be found on our website?here.?     Thanks again for choosing?us?as your health care partner,?   ?  LUISA Hernandez CNP?

## 2025-01-23 ENCOUNTER — ALLIED HEALTH/NURSE VISIT (OUTPATIENT)
Dept: FAMILY MEDICINE | Facility: CLINIC | Age: 30
End: 2025-01-23
Payer: COMMERCIAL

## 2025-01-23 DIAGNOSIS — Z30.42 ENCOUNTER FOR SURVEILLANCE OF INJECTABLE CONTRACEPTIVE: Primary | ICD-10-CM

## 2025-01-23 RX ADMIN — MEDROXYPROGESTERONE ACETATE 150 MG: 150 INJECTION, SUSPENSION INTRAMUSCULAR at 09:52

## 2025-01-23 NOTE — PROGRESS NOTES
Clinic Administered Medication Documentation      Depo Provera Documentation    Depo-Provera Standing Order inclusion/exclusion criteria reviewed.     Is this the initial or subsequent dose of Depo Provera? Subsequent dose - patient is within the acceptable window of time (11-15 weeks) for subsequent injection. Pregnancy test not indicated.    Patient meets: inclusion criteria     Is there an active order (written within the past 365 days, with administrations remaining, not ) in the chart? Yes.     Prior to injection, verified patient identity using patient's name and date of birth. Medication was administered. Please see MAR and medication order for additional information.     Vial/Syringe: Single dose vial. Was entire vial of medication used? Yes    Patient instructed to remain in clinic for 15 minutes and report any adverse reaction to staff immediately.  NEXT INJECTION DUE: 4/10/25 - 25    Verified that the patient has refills remaining in their prescription.

## 2025-08-25 ENCOUNTER — PATIENT OUTREACH (OUTPATIENT)
Dept: CARE COORDINATION | Facility: CLINIC | Age: 30
End: 2025-08-25
Payer: COMMERCIAL